# Patient Record
Sex: FEMALE | Race: WHITE | Employment: OTHER | URBAN - METROPOLITAN AREA
[De-identification: names, ages, dates, MRNs, and addresses within clinical notes are randomized per-mention and may not be internally consistent; named-entity substitution may affect disease eponyms.]

---

## 2019-09-20 ENCOUNTER — HOSPITAL ENCOUNTER (OUTPATIENT)
Age: 74
Setting detail: SPECIMEN
Discharge: HOME | End: 2019-09-20
Payer: MEDICARE

## 2019-09-20 ENCOUNTER — OFFICE VISIT (OUTPATIENT)
Dept: FAMILY MEDICINE CLINIC | Age: 74
End: 2019-09-20
Payer: COMMERCIAL

## 2019-09-20 VITALS
DIASTOLIC BLOOD PRESSURE: 84 MMHG | HEART RATE: 77 BPM | OXYGEN SATURATION: 88 % | RESPIRATION RATE: 16 BRPM | WEIGHT: 255 LBS | HEIGHT: 56 IN | BODY MASS INDEX: 57.36 KG/M2 | SYSTOLIC BLOOD PRESSURE: 128 MMHG | TEMPERATURE: 97.1 F

## 2019-09-20 DIAGNOSIS — R35.0 FREQUENT URINATION: ICD-10-CM

## 2019-09-20 DIAGNOSIS — E66.01 MORBID OBESITY WITH BMI OF 50.0-59.9, ADULT (HCC): ICD-10-CM

## 2019-09-20 DIAGNOSIS — I10 ESSENTIAL HYPERTENSION: ICD-10-CM

## 2019-09-20 DIAGNOSIS — Z86.73 HISTORY OF CVA (CEREBROVASCULAR ACCIDENT): ICD-10-CM

## 2019-09-20 DIAGNOSIS — N30.90 CYSTITIS: ICD-10-CM

## 2019-09-20 DIAGNOSIS — I48.21 PERMANENT ATRIAL FIBRILLATION (HCC): ICD-10-CM

## 2019-09-20 DIAGNOSIS — Z91.81 HISTORY OF FALL: ICD-10-CM

## 2019-09-20 DIAGNOSIS — F03.90 DEMENTIA WITHOUT BEHAVIORAL DISTURBANCE, UNSPECIFIED DEMENTIA TYPE: ICD-10-CM

## 2019-09-20 DIAGNOSIS — N30.90 CYSTITIS: Primary | ICD-10-CM

## 2019-09-20 LAB
BILIRUBIN, POC: NEGATIVE
BLOOD URINE, POC: ABNORMAL
CLARITY, POC: ABNORMAL
COLOR, POC: ABNORMAL
GLUCOSE URINE, POC: NEGATIVE
KETONES, POC: NEGATIVE
LEUKOCYTE EST, POC: POSITIVE
NITRITE, POC: POSITIVE
PH, POC: 6
PROTEIN, POC: 30
SPECIFIC GRAVITY, POC: 1.02
UROBILINOGEN, POC: 2

## 2019-09-20 PROCEDURE — 3017F COLORECTAL CA SCREEN DOC REV: CPT | Performed by: INTERNAL MEDICINE

## 2019-09-20 PROCEDURE — 1090F PRES/ABSN URINE INCON ASSESS: CPT | Performed by: INTERNAL MEDICINE

## 2019-09-20 PROCEDURE — G8417 CALC BMI ABV UP PARAM F/U: HCPCS | Performed by: INTERNAL MEDICINE

## 2019-09-20 PROCEDURE — 1123F ACP DISCUSS/DSCN MKR DOCD: CPT | Performed by: INTERNAL MEDICINE

## 2019-09-20 PROCEDURE — 81002 URINALYSIS NONAUTO W/O SCOPE: CPT | Performed by: INTERNAL MEDICINE

## 2019-09-20 PROCEDURE — 99203 OFFICE O/P NEW LOW 30 MIN: CPT | Performed by: INTERNAL MEDICINE

## 2019-09-20 PROCEDURE — G8400 PT W/DXA NO RESULTS DOC: HCPCS | Performed by: INTERNAL MEDICINE

## 2019-09-20 PROCEDURE — 4040F PNEUMOC VAC/ADMIN/RCVD: CPT | Performed by: INTERNAL MEDICINE

## 2019-09-20 PROCEDURE — 1036F TOBACCO NON-USER: CPT | Performed by: INTERNAL MEDICINE

## 2019-09-20 PROCEDURE — G8427 DOCREV CUR MEDS BY ELIG CLIN: HCPCS | Performed by: INTERNAL MEDICINE

## 2019-09-20 RX ORDER — CLOPIDOGREL BISULFATE 75 MG/1
75 TABLET ORAL DAILY
COMMUNITY
End: 2019-10-09 | Stop reason: ALTCHOICE

## 2019-09-20 RX ORDER — ATORVASTATIN CALCIUM 40 MG/1
40 TABLET, FILM COATED ORAL DAILY
COMMUNITY
End: 2019-11-01 | Stop reason: SDUPTHER

## 2019-09-20 RX ORDER — CITALOPRAM 20 MG/1
20 TABLET ORAL DAILY
COMMUNITY
End: 2019-09-20 | Stop reason: SDUPTHER

## 2019-09-20 RX ORDER — CITALOPRAM 20 MG/1
20 TABLET ORAL DAILY
Qty: 90 TABLET | Refills: 5 | Status: SHIPPED | OUTPATIENT
Start: 2019-09-20 | End: 2020-04-02 | Stop reason: SDUPTHER

## 2019-09-20 RX ORDER — PANTOPRAZOLE SODIUM 40 MG/1
40 TABLET, DELAYED RELEASE ORAL DAILY
COMMUNITY
End: 2019-11-01 | Stop reason: SDUPTHER

## 2019-09-20 RX ORDER — HYDRALAZINE HYDROCHLORIDE 10 MG/1
10 TABLET, FILM COATED ORAL 3 TIMES DAILY
COMMUNITY
End: 2019-11-01 | Stop reason: SDUPTHER

## 2019-09-20 RX ORDER — NITROFURANTOIN 25; 75 MG/1; MG/1
100 CAPSULE ORAL 2 TIMES DAILY
Qty: 14 CAPSULE | Refills: 0 | Status: SHIPPED | OUTPATIENT
Start: 2019-09-20 | End: 2019-10-08 | Stop reason: SDUPTHER

## 2019-09-20 RX ORDER — DONEPEZIL HYDROCHLORIDE 10 MG/1
10 TABLET, FILM COATED ORAL NIGHTLY
COMMUNITY
End: 2019-11-01 | Stop reason: SDUPTHER

## 2019-09-20 RX ORDER — FUROSEMIDE 40 MG/1
40 TABLET ORAL 2 TIMES DAILY
COMMUNITY
End: 2019-09-20 | Stop reason: SDUPTHER

## 2019-09-20 RX ORDER — AMLODIPINE BESYLATE 2.5 MG/1
2.5 TABLET ORAL DAILY
COMMUNITY
End: 2019-10-08 | Stop reason: ALTCHOICE

## 2019-09-20 RX ORDER — FUROSEMIDE 40 MG/1
40 TABLET ORAL 2 TIMES DAILY
Qty: 180 TABLET | Refills: 5 | Status: SHIPPED | OUTPATIENT
Start: 2019-09-20

## 2019-09-20 RX ORDER — WARFARIN SODIUM 5 MG/1
TABLET ORAL
Qty: 90 TABLET | Refills: 5 | Status: SHIPPED | OUTPATIENT
Start: 2019-09-20 | End: 2019-12-19 | Stop reason: SDUPTHER

## 2019-09-20 SDOH — HEALTH STABILITY: MENTAL HEALTH: HOW OFTEN DO YOU HAVE A DRINK CONTAINING ALCOHOL?: NEVER

## 2019-09-21 LAB
CULTURE: ABNORMAL
Lab: ABNORMAL
SPECIMEN DESCRIPTION: ABNORMAL

## 2019-10-01 ENCOUNTER — APPOINTMENT (OUTPATIENT)
Dept: GENERAL RADIOLOGY | Age: 74
End: 2019-10-01
Payer: MEDICARE

## 2019-10-01 ENCOUNTER — APPOINTMENT (OUTPATIENT)
Dept: CT IMAGING | Age: 74
End: 2019-10-01
Payer: MEDICARE

## 2019-10-01 ENCOUNTER — TELEPHONE (OUTPATIENT)
Dept: FAMILY MEDICINE CLINIC | Age: 74
End: 2019-10-01

## 2019-10-01 ENCOUNTER — HOSPITAL ENCOUNTER (EMERGENCY)
Age: 74
Discharge: HOME | End: 2019-10-01
Attending: EMERGENCY MEDICINE
Payer: MEDICARE

## 2019-10-01 VITALS
HEART RATE: 66 BPM | OXYGEN SATURATION: 94 % | BODY MASS INDEX: 55.02 KG/M2 | TEMPERATURE: 98.2 F | DIASTOLIC BLOOD PRESSURE: 53 MMHG | WEIGHT: 255 LBS | SYSTOLIC BLOOD PRESSURE: 122 MMHG | RESPIRATION RATE: 20 BRPM | HEIGHT: 57 IN

## 2019-10-01 DIAGNOSIS — R53.1 GENERAL WEAKNESS: Primary | ICD-10-CM

## 2019-10-01 DIAGNOSIS — E87.6 HYPOKALEMIA: ICD-10-CM

## 2019-10-01 LAB
-: ABNORMAL
ABSOLUTE EOS #: 0.16 K/UL (ref 0–0.44)
ABSOLUTE IMMATURE GRANULOCYTE: 0.01 K/UL (ref 0–0.3)
ABSOLUTE LYMPH #: 0.78 K/UL (ref 1.1–3.7)
ABSOLUTE MONO #: 0.37 K/UL (ref 0.1–1.2)
AMMONIA: 30 UMOL/L (ref 11–51)
AMORPHOUS: ABNORMAL
ANION GAP SERPL CALCULATED.3IONS-SCNC: 7 MMOL/L (ref 9–17)
BACTERIA: ABNORMAL
BASOPHILS # BLD: 1 % (ref 0–2)
BASOPHILS ABSOLUTE: 0.05 K/UL (ref 0–0.2)
BILIRUBIN URINE: NEGATIVE
BNP INTERPRETATION: ABNORMAL
BUN BLDV-MCNC: 13 MG/DL (ref 8–23)
BUN/CREAT BLD: 22 (ref 9–20)
CALCIUM SERPL-MCNC: 8.7 MG/DL (ref 8.6–10.4)
CASTS UA: ABNORMAL /LPF
CHLORIDE BLD-SCNC: 102 MMOL/L (ref 98–107)
CHP ED QC CHECK: NORMAL
CO2: 36 MMOL/L (ref 20–31)
COLOR: YELLOW
COMMENT UA: ABNORMAL
CREAT SERPL-MCNC: 0.6 MG/DL (ref 0.5–0.9)
CRYSTALS, UA: ABNORMAL /HPF
DIFFERENTIAL TYPE: ABNORMAL
EOSINOPHILS RELATIVE PERCENT: 3 % (ref 1–4)
EPITHELIAL CELLS UA: ABNORMAL /HPF (ref 0–5)
GFR AFRICAN AMERICAN: >60 ML/MIN
GFR NON-AFRICAN AMERICAN: >60 ML/MIN
GFR SERPL CREATININE-BSD FRML MDRD: ABNORMAL ML/MIN/{1.73_M2}
GFR SERPL CREATININE-BSD FRML MDRD: ABNORMAL ML/MIN/{1.73_M2}
GLUCOSE BLD-MCNC: 105 MG/DL
GLUCOSE BLD-MCNC: 105 MG/DL (ref 65–105)
GLUCOSE BLD-MCNC: 123 MG/DL (ref 70–99)
GLUCOSE URINE: NEGATIVE
HCT VFR BLD CALC: 43.7 % (ref 36.3–47.1)
HEMOGLOBIN: 13.1 G/DL (ref 11.9–15.1)
IMMATURE GRANULOCYTES: 0 %
INR BLD: 1.9
KETONES, URINE: NEGATIVE
LEUKOCYTE ESTERASE, URINE: NEGATIVE
LYMPHOCYTES # BLD: 13 % (ref 24–43)
MAGNESIUM: 1.8 MG/DL (ref 1.6–2.6)
MCH RBC QN AUTO: 30.9 PG (ref 25.2–33.5)
MCHC RBC AUTO-ENTMCNC: 30 G/DL (ref 28.4–34.8)
MCV RBC AUTO: 103.1 FL (ref 82.6–102.9)
MONOCYTES # BLD: 6 % (ref 3–12)
MUCUS: ABNORMAL
NITRITE, URINE: NEGATIVE
NRBC AUTOMATED: 0 PER 100 WBC
OTHER OBSERVATIONS UA: ABNORMAL
PDW BLD-RTO: 13.9 % (ref 11.8–14.4)
PH UA: 5.5 (ref 5–8)
PLATELET # BLD: 194 K/UL (ref 138–453)
PLATELET ESTIMATE: ABNORMAL
PMV BLD AUTO: 10.8 FL (ref 8.1–13.5)
POTASSIUM SERPL-SCNC: 3.2 MMOL/L (ref 3.7–5.3)
PRO-BNP: 605 PG/ML
PROTEIN UA: ABNORMAL
PROTHROMBIN TIME: 19.1 SEC (ref 9.7–11.6)
RBC # BLD: 4.24 M/UL (ref 3.95–5.11)
RBC # BLD: ABNORMAL 10*6/UL
RBC UA: ABNORMAL /HPF (ref 0–2)
RENAL EPITHELIAL, UA: ABNORMAL /HPF
SEG NEUTROPHILS: 77 % (ref 36–65)
SEGMENTED NEUTROPHILS ABSOLUTE COUNT: 4.48 K/UL (ref 1.5–8.1)
SODIUM BLD-SCNC: 145 MMOL/L (ref 135–144)
SPECIFIC GRAVITY UA: 1.02 (ref 1–1.03)
THYROXINE, FREE: 1.28 NG/DL (ref 0.93–1.7)
TRICHOMONAS: ABNORMAL
TROPONIN INTERP: NORMAL
TROPONIN T: NORMAL NG/ML
TROPONIN, HIGH SENSITIVITY: 7 NG/L (ref 0–14)
TSH SERPL DL<=0.05 MIU/L-ACNC: 2.01 MIU/L (ref 0.3–5)
TURBIDITY: CLEAR
URINE HGB: ABNORMAL
UROBILINOGEN, URINE: ABNORMAL
WBC # BLD: 5.9 K/UL (ref 3.5–11.3)
WBC # BLD: ABNORMAL 10*3/UL
WBC UA: ABNORMAL /HPF (ref 0–5)
YEAST: ABNORMAL

## 2019-10-01 PROCEDURE — 6370000000 HC RX 637 (ALT 250 FOR IP): Performed by: EMERGENCY MEDICINE

## 2019-10-01 PROCEDURE — 84439 ASSAY OF FREE THYROXINE: CPT

## 2019-10-01 PROCEDURE — 70498 CT ANGIOGRAPHY NECK: CPT

## 2019-10-01 PROCEDURE — 85610 PROTHROMBIN TIME: CPT

## 2019-10-01 PROCEDURE — 82140 ASSAY OF AMMONIA: CPT

## 2019-10-01 PROCEDURE — 84484 ASSAY OF TROPONIN QUANT: CPT

## 2019-10-01 PROCEDURE — 70450 CT HEAD/BRAIN W/O DYE: CPT

## 2019-10-01 PROCEDURE — 71045 X-RAY EXAM CHEST 1 VIEW: CPT

## 2019-10-01 PROCEDURE — 85025 COMPLETE CBC W/AUTO DIFF WBC: CPT

## 2019-10-01 PROCEDURE — 83880 ASSAY OF NATRIURETIC PEPTIDE: CPT

## 2019-10-01 PROCEDURE — 2580000003 HC RX 258: Performed by: EMERGENCY MEDICINE

## 2019-10-01 PROCEDURE — 99284 EMERGENCY DEPT VISIT MOD MDM: CPT

## 2019-10-01 PROCEDURE — 84443 ASSAY THYROID STIM HORMONE: CPT

## 2019-10-01 PROCEDURE — 83735 ASSAY OF MAGNESIUM: CPT

## 2019-10-01 PROCEDURE — 80048 BASIC METABOLIC PNL TOTAL CA: CPT

## 2019-10-01 PROCEDURE — 81001 URINALYSIS AUTO W/SCOPE: CPT

## 2019-10-01 PROCEDURE — 6360000004 HC RX CONTRAST MEDICATION: Performed by: EMERGENCY MEDICINE

## 2019-10-01 PROCEDURE — 82947 ASSAY GLUCOSE BLOOD QUANT: CPT

## 2019-10-01 RX ORDER — SODIUM CHLORIDE 0.9 % (FLUSH) 0.9 %
10 SYRINGE (ML) INJECTION
Status: COMPLETED | OUTPATIENT
Start: 2019-10-01 | End: 2019-10-01

## 2019-10-01 RX ORDER — POTASSIUM CHLORIDE 20 MEQ/1
20 TABLET, EXTENDED RELEASE ORAL DAILY
Qty: 20 TABLET | Refills: 0 | Status: ON HOLD | OUTPATIENT
Start: 2019-10-01 | End: 2019-11-22 | Stop reason: SDDI

## 2019-10-01 RX ORDER — 0.9 % SODIUM CHLORIDE 0.9 %
80 INTRAVENOUS SOLUTION INTRAVENOUS ONCE
Status: COMPLETED | OUTPATIENT
Start: 2019-10-01 | End: 2019-10-01

## 2019-10-01 RX ORDER — POTASSIUM CHLORIDE 20 MEQ/1
40 TABLET, EXTENDED RELEASE ORAL ONCE
Status: COMPLETED | OUTPATIENT
Start: 2019-10-01 | End: 2019-10-01

## 2019-10-01 RX ADMIN — POTASSIUM CHLORIDE 40 MEQ: 20 TABLET, EXTENDED RELEASE ORAL at 15:27

## 2019-10-01 RX ADMIN — SODIUM CHLORIDE 80 ML: 0.9 INJECTION, SOLUTION INTRAVENOUS at 15:58

## 2019-10-01 RX ADMIN — IOPAMIDOL 75 ML: 755 INJECTION, SOLUTION INTRAVENOUS at 14:03

## 2019-10-01 RX ADMIN — Medication 10 ML: at 14:18

## 2019-10-01 RX ADMIN — SODIUM CHLORIDE 80 ML: 0.9 INJECTION, SOLUTION INTRAVENOUS at 14:10

## 2019-10-01 ASSESSMENT — ENCOUNTER SYMPTOMS
ABDOMINAL PAIN: 0
EYE DISCHARGE: 0
FACIAL SWELLING: 0
EYE PAIN: 0
CHEST TIGHTNESS: 0
SHORTNESS OF BREATH: 0
ABDOMINAL DISTENTION: 0
BACK PAIN: 0

## 2019-10-02 ENCOUNTER — TELEPHONE (OUTPATIENT)
Dept: FAMILY MEDICINE CLINIC | Age: 74
End: 2019-10-02

## 2019-10-02 RX ORDER — NYSTATIN 100000 [USP'U]/G
POWDER TOPICAL
Qty: 60 G | Refills: 5 | Status: ON HOLD | OUTPATIENT
Start: 2019-10-02 | End: 2019-11-22

## 2019-10-08 ENCOUNTER — OFFICE VISIT (OUTPATIENT)
Dept: FAMILY MEDICINE CLINIC | Age: 74
End: 2019-10-08
Payer: MEDICARE

## 2019-10-08 ENCOUNTER — HOSPITAL ENCOUNTER (OUTPATIENT)
Age: 74
Setting detail: SPECIMEN
Discharge: HOME | End: 2019-10-08
Payer: MEDICARE

## 2019-10-08 VITALS
HEIGHT: 57 IN | WEIGHT: 253 LBS | OXYGEN SATURATION: 91 % | HEART RATE: 76 BPM | RESPIRATION RATE: 18 BRPM | BODY MASS INDEX: 54.58 KG/M2 | SYSTOLIC BLOOD PRESSURE: 110 MMHG | DIASTOLIC BLOOD PRESSURE: 58 MMHG

## 2019-10-08 DIAGNOSIS — I10 ESSENTIAL HYPERTENSION: ICD-10-CM

## 2019-10-08 DIAGNOSIS — R35.0 FREQUENT URINATION: Primary | ICD-10-CM

## 2019-10-08 DIAGNOSIS — R35.0 FREQUENT URINATION: ICD-10-CM

## 2019-10-08 DIAGNOSIS — S81.009S: ICD-10-CM

## 2019-10-08 DIAGNOSIS — L03.90 WOUND CELLULITIS: ICD-10-CM

## 2019-10-08 DIAGNOSIS — R41.0 CONFUSION: ICD-10-CM

## 2019-10-08 DIAGNOSIS — F01.50 VASCULAR DEMENTIA WITHOUT BEHAVIORAL DISTURBANCE (HCC): ICD-10-CM

## 2019-10-08 DIAGNOSIS — N39.46 MIXED STRESS AND URGE URINARY INCONTINENCE: ICD-10-CM

## 2019-10-08 DIAGNOSIS — R41.82 ALTERED MENTAL STATUS, UNSPECIFIED ALTERED MENTAL STATUS TYPE: ICD-10-CM

## 2019-10-08 LAB
BILIRUBIN, POC: NEGATIVE
BLOOD URINE, POC: NORMAL
CLARITY, POC: NORMAL
COLOR, POC: NORMAL
GLUCOSE URINE, POC: NORMAL
KETONES, POC: NORMAL
LEUKOCYTE EST, POC: NEGATIVE
NITRITE, POC: NEGATIVE
PH, POC: 7
PROTEIN, POC: NEGATIVE
SPECIFIC GRAVITY, POC: 1.02
UROBILINOGEN, POC: 1

## 2019-10-08 PROCEDURE — 4040F PNEUMOC VAC/ADMIN/RCVD: CPT | Performed by: INTERNAL MEDICINE

## 2019-10-08 PROCEDURE — 99214 OFFICE O/P EST MOD 30 MIN: CPT | Performed by: INTERNAL MEDICINE

## 2019-10-08 PROCEDURE — 81002 URINALYSIS NONAUTO W/O SCOPE: CPT | Performed by: INTERNAL MEDICINE

## 2019-10-08 PROCEDURE — G8427 DOCREV CUR MEDS BY ELIG CLIN: HCPCS | Performed by: INTERNAL MEDICINE

## 2019-10-08 PROCEDURE — 3017F COLORECTAL CA SCREEN DOC REV: CPT | Performed by: INTERNAL MEDICINE

## 2019-10-08 PROCEDURE — 1123F ACP DISCUSS/DSCN MKR DOCD: CPT | Performed by: INTERNAL MEDICINE

## 2019-10-08 PROCEDURE — G8484 FLU IMMUNIZE NO ADMIN: HCPCS | Performed by: INTERNAL MEDICINE

## 2019-10-08 PROCEDURE — G8417 CALC BMI ABV UP PARAM F/U: HCPCS | Performed by: INTERNAL MEDICINE

## 2019-10-08 PROCEDURE — 1036F TOBACCO NON-USER: CPT | Performed by: INTERNAL MEDICINE

## 2019-10-08 PROCEDURE — G8400 PT W/DXA NO RESULTS DOC: HCPCS | Performed by: INTERNAL MEDICINE

## 2019-10-08 PROCEDURE — 1090F PRES/ABSN URINE INCON ASSESS: CPT | Performed by: INTERNAL MEDICINE

## 2019-10-08 PROCEDURE — 0509F URINE INCON PLAN DOCD: CPT | Performed by: INTERNAL MEDICINE

## 2019-10-08 RX ORDER — FLUCONAZOLE 150 MG/1
150 TABLET ORAL EVERY OTHER DAY
Qty: 7 TABLET | Refills: 1 | Status: SHIPPED | OUTPATIENT
Start: 2019-10-08 | End: 2019-10-09 | Stop reason: ALTCHOICE

## 2019-10-08 RX ORDER — TERBINAFINE HYDROCHLORIDE 250 MG/1
250 TABLET ORAL DAILY
Qty: 14 TABLET | Refills: 0 | Status: SHIPPED | OUTPATIENT
Start: 2019-10-08 | End: 2019-10-08 | Stop reason: SDUPTHER

## 2019-10-08 RX ORDER — NITROFURANTOIN 25; 75 MG/1; MG/1
100 CAPSULE ORAL 2 TIMES DAILY
Qty: 10 CAPSULE | Refills: 0 | Status: SHIPPED | OUTPATIENT
Start: 2019-10-08 | End: 2019-10-13

## 2019-10-08 RX ORDER — TERBINAFINE HYDROCHLORIDE 250 MG/1
250 TABLET ORAL DAILY
Qty: 14 TABLET | Refills: 0 | Status: SHIPPED | OUTPATIENT
Start: 2019-10-08 | End: 2019-10-22

## 2019-10-08 ASSESSMENT — ENCOUNTER SYMPTOMS
SHORTNESS OF BREATH: 0
BACK PAIN: 0
ABDOMINAL PAIN: 0

## 2019-10-09 ASSESSMENT — ENCOUNTER SYMPTOMS
DIARRHEA: 0
COUGH: 0
BLOOD IN STOOL: 0
CONSTIPATION: 0
NAUSEA: 0
CHEST TIGHTNESS: 0
ANAL BLEEDING: 0

## 2019-10-10 LAB
CULTURE: ABNORMAL
Lab: ABNORMAL
SPECIMEN DESCRIPTION: ABNORMAL

## 2019-10-18 ENCOUNTER — TELEPHONE (OUTPATIENT)
Dept: FAMILY MEDICINE CLINIC | Age: 74
End: 2019-10-18

## 2019-10-18 RX ORDER — CEPHALEXIN 500 MG/1
500 CAPSULE ORAL 3 TIMES DAILY
Qty: 21 CAPSULE | Refills: 0 | Status: SHIPPED | OUTPATIENT
Start: 2019-10-18 | End: 2019-11-13

## 2019-10-22 ENCOUNTER — TELEPHONE (OUTPATIENT)
Dept: FAMILY MEDICINE CLINIC | Age: 74
End: 2019-10-22

## 2019-10-29 ENCOUNTER — TELEPHONE (OUTPATIENT)
Dept: FAMILY MEDICINE CLINIC | Age: 74
End: 2019-10-29

## 2019-10-29 DIAGNOSIS — I48.21 PERMANENT ATRIAL FIBRILLATION (HCC): Primary | ICD-10-CM

## 2019-10-31 LAB
INR BLD: 2.3
PROTIME: 26.1 SECONDS

## 2019-11-01 DIAGNOSIS — I48.21 PERMANENT ATRIAL FIBRILLATION (HCC): ICD-10-CM

## 2019-11-01 RX ORDER — ATORVASTATIN CALCIUM 40 MG/1
40 TABLET, FILM COATED ORAL DAILY
Qty: 30 TABLET | Refills: 5 | Status: SHIPPED | OUTPATIENT
Start: 2019-11-01

## 2019-11-01 RX ORDER — HYDRALAZINE HYDROCHLORIDE 10 MG/1
10 TABLET, FILM COATED ORAL 3 TIMES DAILY
Qty: 90 TABLET | Refills: 5 | Status: SHIPPED | OUTPATIENT
Start: 2019-11-01

## 2019-11-01 RX ORDER — DONEPEZIL HYDROCHLORIDE 10 MG/1
10 TABLET, FILM COATED ORAL NIGHTLY
Qty: 30 TABLET | Refills: 5 | Status: SHIPPED | OUTPATIENT
Start: 2019-11-01

## 2019-11-01 RX ORDER — PANTOPRAZOLE SODIUM 40 MG/1
40 TABLET, DELAYED RELEASE ORAL DAILY
Qty: 30 TABLET | Refills: 5 | Status: SHIPPED | OUTPATIENT
Start: 2019-11-01

## 2019-11-01 RX ORDER — CLOPIDOGREL BISULFATE 75 MG/1
75 TABLET ORAL DAILY
Qty: 30 TABLET | Refills: 5 | OUTPATIENT
Start: 2019-11-01

## 2019-11-06 ENCOUNTER — TELEPHONE (OUTPATIENT)
Dept: WOUND CARE | Age: 74
End: 2019-11-06

## 2019-11-06 ENCOUNTER — HOSPITAL ENCOUNTER (OUTPATIENT)
Dept: WOUND CARE | Age: 74
Discharge: HOME | End: 2019-11-06

## 2019-11-12 ENCOUNTER — TELEPHONE (OUTPATIENT)
Dept: PHARMACY | Age: 74
End: 2019-11-12

## 2019-11-13 ENCOUNTER — TELEPHONE (OUTPATIENT)
Dept: PHARMACY | Age: 74
End: 2019-11-13

## 2019-11-13 ENCOUNTER — HOSPITAL ENCOUNTER (OUTPATIENT)
Dept: WOUND CARE | Age: 74
Discharge: HOME | End: 2019-11-13
Payer: MEDICARE

## 2019-11-13 VITALS
WEIGHT: 255 LBS | HEIGHT: 56 IN | DIASTOLIC BLOOD PRESSURE: 87 MMHG | SYSTOLIC BLOOD PRESSURE: 169 MMHG | BODY MASS INDEX: 57.36 KG/M2 | RESPIRATION RATE: 16 BRPM | HEART RATE: 75 BPM | TEMPERATURE: 97.5 F

## 2019-11-13 DIAGNOSIS — F42.4 EXCORIATION (SKIN-PICKING) DISORDER: ICD-10-CM

## 2019-11-13 DIAGNOSIS — F01.50 VASCULAR DEMENTIA WITHOUT BEHAVIORAL DISTURBANCE (HCC): Primary | ICD-10-CM

## 2019-11-13 DIAGNOSIS — T14.8XXA MULTIPLE WOUNDS OF SKIN: ICD-10-CM

## 2019-11-13 DIAGNOSIS — I48.0 PAROXYSMAL ATRIAL FIBRILLATION (HCC): ICD-10-CM

## 2019-11-13 PROCEDURE — 6370000000 HC RX 637 (ALT 250 FOR IP): Performed by: SURGERY

## 2019-11-13 PROCEDURE — 99214 OFFICE O/P EST MOD 30 MIN: CPT

## 2019-11-13 PROCEDURE — 97597 DBRDMT OPN WND 1ST 20 CM/<: CPT

## 2019-11-13 RX ORDER — LIDOCAINE HYDROCHLORIDE 40 MG/ML
SOLUTION TOPICAL ONCE
Status: COMPLETED | OUTPATIENT
Start: 2019-11-13 | End: 2019-11-13

## 2019-11-13 RX ADMIN — LIDOCAINE HYDROCHLORIDE: 40 SOLUTION TOPICAL at 15:24

## 2019-11-13 SDOH — HEALTH STABILITY: PHYSICAL HEALTH: ON AVERAGE, HOW MANY DAYS PER WEEK DO YOU ENGAGE IN MODERATE TO STRENUOUS EXERCISE (LIKE A BRISK WALK)?: 0 DAYS

## 2019-11-13 ASSESSMENT — PAIN SCALES - GENERAL: PAINLEVEL_OUTOF10: 0

## 2019-11-13 ASSESSMENT — PAIN DESCRIPTION - PAIN TYPE: TYPE: CHRONIC PAIN

## 2019-11-17 LAB — INR BLD: 3.4

## 2019-11-18 ENCOUNTER — HOSPITAL ENCOUNTER (OUTPATIENT)
Dept: PHARMACY | Age: 74
Discharge: HOME | End: 2019-11-18

## 2019-11-18 DIAGNOSIS — I48.91 ATRIAL FIBRILLATION, UNSPECIFIED TYPE (HCC): ICD-10-CM

## 2019-11-21 ENCOUNTER — HOSPITAL ENCOUNTER (INPATIENT)
Age: 74
LOS: 4 days | Discharge: NURSING, REHAB OR LONG-TERM CARE | DRG: 291 | End: 2019-11-25
Attending: EMERGENCY MEDICINE | Admitting: INTERNAL MEDICINE
Payer: MEDICARE

## 2019-11-21 ENCOUNTER — APPOINTMENT (OUTPATIENT)
Dept: GENERAL RADIOLOGY | Age: 74
DRG: 291 | End: 2019-11-21
Payer: MEDICARE

## 2019-11-21 ENCOUNTER — TELEPHONE (OUTPATIENT)
Dept: FAMILY MEDICINE CLINIC | Age: 74
End: 2019-11-21

## 2019-11-21 DIAGNOSIS — I50.23 ACUTE ON CHRONIC SYSTOLIC CONGESTIVE HEART FAILURE (HCC): Primary | ICD-10-CM

## 2019-11-21 DIAGNOSIS — R09.02 HYPOXIA: ICD-10-CM

## 2019-11-21 PROBLEM — I50.9 HEART FAILURE (HCC): Status: ACTIVE | Noted: 2019-11-21

## 2019-11-21 LAB
ABSOLUTE EOS #: 0 K/UL (ref 0–0.4)
ABSOLUTE IMMATURE GRANULOCYTE: 0 K/UL (ref 0–0.3)
ABSOLUTE LYMPH #: 0.31 K/UL (ref 1–4.8)
ABSOLUTE MONO #: 0.82 K/UL (ref 0.2–0.8)
ALBUMIN SERPL-MCNC: 3.8 G/DL (ref 3.5–5.2)
ALBUMIN/GLOBULIN RATIO: ABNORMAL (ref 1–2.5)
ALP BLD-CCNC: 158 U/L (ref 35–104)
ALT SERPL-CCNC: 7 U/L (ref 5–33)
ANION GAP SERPL CALCULATED.3IONS-SCNC: 12 MMOL/L (ref 9–17)
AST SERPL-CCNC: 14 U/L
BASOPHILS # BLD: 0 %
BASOPHILS ABSOLUTE: 0 K/UL (ref 0–0.2)
BILIRUB SERPL-MCNC: 1.4 MG/DL (ref 0.3–1.2)
BNP INTERPRETATION: ABNORMAL
BUN BLDV-MCNC: 12 MG/DL (ref 8–23)
BUN/CREAT BLD: 20 (ref 9–20)
CALCIUM SERPL-MCNC: 9.1 MG/DL (ref 8.6–10.4)
CHLORIDE BLD-SCNC: 96 MMOL/L (ref 98–107)
CO2: 33 MMOL/L (ref 20–31)
CREAT SERPL-MCNC: 0.59 MG/DL (ref 0.5–0.9)
DIFFERENTIAL TYPE: ABNORMAL
DIRECT EXAM: NORMAL
EOSINOPHILS RELATIVE PERCENT: 0 % (ref 1–4)
GFR AFRICAN AMERICAN: >60 ML/MIN
GFR NON-AFRICAN AMERICAN: >60 ML/MIN
GFR SERPL CREATININE-BSD FRML MDRD: ABNORMAL ML/MIN/{1.73_M2}
GFR SERPL CREATININE-BSD FRML MDRD: ABNORMAL ML/MIN/{1.73_M2}
GLUCOSE BLD-MCNC: 122 MG/DL (ref 70–99)
HCT VFR BLD CALC: 44.7 % (ref 36.3–47.1)
HEMOGLOBIN: 13.8 G/DL (ref 11.9–15.1)
IMMATURE GRANULOCYTES: 0 %
INR BLD: 1.5
LYMPHOCYTES # BLD: 3 % (ref 24–44)
Lab: NORMAL
MAGNESIUM: 1.8 MG/DL (ref 1.6–2.6)
MCH RBC QN AUTO: 30.7 PG (ref 25.2–33.5)
MCHC RBC AUTO-ENTMCNC: 30.9 G/DL (ref 28.4–34.8)
MCV RBC AUTO: 99.3 FL (ref 82.6–102.9)
MONOCYTES # BLD: 8 % (ref 1–7)
NRBC AUTOMATED: 0 PER 100 WBC
PDW BLD-RTO: 14.3 % (ref 11.8–14.4)
PLATELET # BLD: 201 K/UL (ref 138–453)
PLATELET ESTIMATE: ABNORMAL
PMV BLD AUTO: 10.6 FL (ref 8.1–13.5)
POTASSIUM SERPL-SCNC: 3.3 MMOL/L (ref 3.7–5.3)
PRO-BNP: 1087 PG/ML
PROTHROMBIN TIME: 15.2 SEC (ref 9.7–11.6)
RBC # BLD: 4.5 M/UL (ref 3.95–5.11)
RBC # BLD: ABNORMAL 10*6/UL
SEG NEUTROPHILS: 89 % (ref 36–66)
SEGMENTED NEUTROPHILS ABSOLUTE COUNT: 9.17 K/UL (ref 1.8–7.7)
SODIUM BLD-SCNC: 141 MMOL/L (ref 135–144)
SPECIMEN DESCRIPTION: NORMAL
TOTAL PROTEIN: 6.9 G/DL (ref 6.4–8.3)
TROPONIN INTERP: NORMAL
TROPONIN INTERP: NORMAL
TROPONIN T: NORMAL NG/ML
TROPONIN T: NORMAL NG/ML
TROPONIN, HIGH SENSITIVITY: 8 NG/L (ref 0–14)
TROPONIN, HIGH SENSITIVITY: 9 NG/L (ref 0–14)
WBC # BLD: 10.3 K/UL (ref 3.5–11.3)
WBC # BLD: ABNORMAL 10*3/UL

## 2019-11-21 PROCEDURE — 80053 COMPREHEN METABOLIC PANEL: CPT

## 2019-11-21 PROCEDURE — 36415 COLL VENOUS BLD VENIPUNCTURE: CPT

## 2019-11-21 PROCEDURE — 87086 URINE CULTURE/COLONY COUNT: CPT

## 2019-11-21 PROCEDURE — 87329 GIARDIA AG IA: CPT

## 2019-11-21 PROCEDURE — 87804 INFLUENZA ASSAY W/OPTIC: CPT

## 2019-11-21 PROCEDURE — 93005 ELECTROCARDIOGRAM TRACING: CPT | Performed by: EMERGENCY MEDICINE

## 2019-11-21 PROCEDURE — 1200000000 HC SEMI PRIVATE

## 2019-11-21 PROCEDURE — 87328 CRYPTOSPORIDIUM AG IA: CPT

## 2019-11-21 PROCEDURE — 85025 COMPLETE CBC W/AUTO DIFF WBC: CPT

## 2019-11-21 PROCEDURE — 83880 ASSAY OF NATRIURETIC PEPTIDE: CPT

## 2019-11-21 PROCEDURE — 99285 EMERGENCY DEPT VISIT HI MDM: CPT

## 2019-11-21 PROCEDURE — 96374 THER/PROPH/DIAG INJ IV PUSH: CPT

## 2019-11-21 PROCEDURE — 83735 ASSAY OF MAGNESIUM: CPT

## 2019-11-21 PROCEDURE — 6370000000 HC RX 637 (ALT 250 FOR IP): Performed by: EMERGENCY MEDICINE

## 2019-11-21 PROCEDURE — 85610 PROTHROMBIN TIME: CPT

## 2019-11-21 PROCEDURE — 99223 1ST HOSP IP/OBS HIGH 75: CPT | Performed by: NURSE PRACTITIONER

## 2019-11-21 PROCEDURE — 87506 IADNA-DNA/RNA PROBE TQ 6-11: CPT

## 2019-11-21 PROCEDURE — 6360000002 HC RX W HCPCS: Performed by: EMERGENCY MEDICINE

## 2019-11-21 PROCEDURE — 87493 C DIFF AMPLIFIED PROBE: CPT

## 2019-11-21 PROCEDURE — 71046 X-RAY EXAM CHEST 2 VIEWS: CPT

## 2019-11-21 PROCEDURE — 84484 ASSAY OF TROPONIN QUANT: CPT

## 2019-11-21 RX ORDER — POTASSIUM CHLORIDE 20 MEQ/1
20 TABLET, EXTENDED RELEASE ORAL DAILY
Status: DISCONTINUED | OUTPATIENT
Start: 2019-11-22 | End: 2019-11-25 | Stop reason: HOSPADM

## 2019-11-21 RX ORDER — FUROSEMIDE 10 MG/ML
40 INJECTION INTRAMUSCULAR; INTRAVENOUS ONCE
Status: COMPLETED | OUTPATIENT
Start: 2019-11-21 | End: 2019-11-21

## 2019-11-21 RX ORDER — DONEPEZIL HYDROCHLORIDE 10 MG/1
10 TABLET, FILM COATED ORAL NIGHTLY
Status: DISCONTINUED | OUTPATIENT
Start: 2019-11-21 | End: 2019-11-25 | Stop reason: HOSPADM

## 2019-11-21 RX ORDER — ACETAMINOPHEN 325 MG/1
650 TABLET ORAL EVERY 4 HOURS PRN
Status: DISCONTINUED | OUTPATIENT
Start: 2019-11-21 | End: 2019-11-25 | Stop reason: HOSPADM

## 2019-11-21 RX ORDER — WARFARIN SODIUM 5 MG/1
5 TABLET ORAL DAILY
Status: DISCONTINUED | OUTPATIENT
Start: 2019-11-21 | End: 2019-11-25 | Stop reason: HOSPADM

## 2019-11-21 RX ORDER — POTASSIUM CHLORIDE 20 MEQ/1
40 TABLET, EXTENDED RELEASE ORAL ONCE
Status: COMPLETED | OUTPATIENT
Start: 2019-11-21 | End: 2019-11-21

## 2019-11-21 RX ORDER — ONDANSETRON 4 MG/1
4 TABLET, ORALLY DISINTEGRATING ORAL EVERY 6 HOURS PRN
Status: DISCONTINUED | OUTPATIENT
Start: 2019-11-21 | End: 2019-11-25 | Stop reason: HOSPADM

## 2019-11-21 RX ORDER — LISINOPRIL 5 MG/1
5 TABLET ORAL DAILY
Status: DISCONTINUED | OUTPATIENT
Start: 2019-11-22 | End: 2019-11-25 | Stop reason: HOSPADM

## 2019-11-21 RX ORDER — ONDANSETRON 2 MG/ML
4 INJECTION INTRAMUSCULAR; INTRAVENOUS EVERY 6 HOURS PRN
Status: DISCONTINUED | OUTPATIENT
Start: 2019-11-21 | End: 2019-11-21 | Stop reason: SDUPTHER

## 2019-11-21 RX ORDER — ATORVASTATIN CALCIUM 40 MG/1
40 TABLET, FILM COATED ORAL DAILY
Status: DISCONTINUED | OUTPATIENT
Start: 2019-11-22 | End: 2019-11-25 | Stop reason: HOSPADM

## 2019-11-21 RX ORDER — CARVEDILOL 3.12 MG/1
3.12 TABLET ORAL 2 TIMES DAILY WITH MEALS
Status: DISCONTINUED | OUTPATIENT
Start: 2019-11-22 | End: 2019-11-25 | Stop reason: HOSPADM

## 2019-11-21 RX ORDER — FUROSEMIDE 10 MG/ML
40 INJECTION INTRAMUSCULAR; INTRAVENOUS 2 TIMES DAILY
Status: DISCONTINUED | OUTPATIENT
Start: 2019-11-22 | End: 2019-11-22

## 2019-11-21 RX ORDER — CITALOPRAM 20 MG/1
20 TABLET ORAL DAILY
Status: DISCONTINUED | OUTPATIENT
Start: 2019-11-22 | End: 2019-11-25 | Stop reason: HOSPADM

## 2019-11-21 RX ORDER — PANTOPRAZOLE SODIUM 40 MG/1
40 TABLET, DELAYED RELEASE ORAL
Status: DISCONTINUED | OUTPATIENT
Start: 2019-11-22 | End: 2019-11-25 | Stop reason: HOSPADM

## 2019-11-21 RX ORDER — SODIUM CHLORIDE 0.9 % (FLUSH) 0.9 %
10 SYRINGE (ML) INJECTION PRN
Status: DISCONTINUED | OUTPATIENT
Start: 2019-11-21 | End: 2019-11-25 | Stop reason: HOSPADM

## 2019-11-21 RX ORDER — HYDRALAZINE HYDROCHLORIDE 10 MG/1
10 TABLET, FILM COATED ORAL 3 TIMES DAILY
Status: DISCONTINUED | OUTPATIENT
Start: 2019-11-21 | End: 2019-11-25 | Stop reason: HOSPADM

## 2019-11-21 RX ORDER — SODIUM CHLORIDE 0.9 % (FLUSH) 0.9 %
10 SYRINGE (ML) INJECTION EVERY 12 HOURS SCHEDULED
Status: DISCONTINUED | OUTPATIENT
Start: 2019-11-21 | End: 2019-11-25 | Stop reason: HOSPADM

## 2019-11-21 RX ORDER — NICOTINE 21 MG/24HR
1 PATCH, TRANSDERMAL 24 HOURS TRANSDERMAL DAILY PRN
Status: DISCONTINUED | OUTPATIENT
Start: 2019-11-21 | End: 2019-11-25 | Stop reason: HOSPADM

## 2019-11-21 RX ORDER — ONDANSETRON 2 MG/ML
4 INJECTION INTRAMUSCULAR; INTRAVENOUS EVERY 6 HOURS PRN
Status: DISCONTINUED | OUTPATIENT
Start: 2019-11-21 | End: 2019-11-25 | Stop reason: HOSPADM

## 2019-11-21 RX ADMIN — POTASSIUM CHLORIDE 40 MEQ: 20 TABLET, EXTENDED RELEASE ORAL at 21:23

## 2019-11-21 RX ADMIN — FUROSEMIDE 40 MG: 10 INJECTION, SOLUTION INTRAMUSCULAR; INTRAVENOUS at 21:23

## 2019-11-21 ASSESSMENT — ENCOUNTER SYMPTOMS
SHORTNESS OF BREATH: 1
DIARRHEA: 1
WHEEZING: 1
COUGH: 1
SPUTUM PRODUCTION: 1

## 2019-11-21 ASSESSMENT — PAIN SCALES - GENERAL: PAINLEVEL_OUTOF10: 2

## 2019-11-22 PROBLEM — I87.2 STASIS DERMATITIS OF BOTH LEGS: Status: ACTIVE | Noted: 2019-11-22

## 2019-11-22 PROBLEM — J20.8 ACUTE BRONCHITIS DUE TO OTHER SPECIFIED ORGANISMS: Status: ACTIVE | Noted: 2019-11-22

## 2019-11-22 PROBLEM — I89.0 LYMPHEDEMA OF BOTH LOWER EXTREMITIES: Status: ACTIVE | Noted: 2019-11-22

## 2019-11-22 LAB
-: ABNORMAL
AMORPHOUS: ABNORMAL
BACTERIA: ABNORMAL
BILIRUBIN URINE: NEGATIVE
BNP INTERPRETATION: ABNORMAL
CASTS UA: ABNORMAL /LPF
CHOLESTEROL/HDL RATIO: 2.7
CHOLESTEROL: 106 MG/DL
COLOR: YELLOW
COMMENT UA: ABNORMAL
CRYSTALS, UA: ABNORMAL /HPF
EKG ATRIAL RATE: 192 BPM
EKG Q-T INTERVAL: 430 MS
EKG QRS DURATION: 112 MS
EKG QTC CALCULATION (BAZETT): 517 MS
EKG R AXIS: -24 DEGREES
EKG T AXIS: 0 DEGREES
EKG VENTRICULAR RATE: 87 BPM
EPITHELIAL CELLS UA: ABNORMAL /HPF (ref 0–5)
GLUCOSE URINE: NEGATIVE
HCT VFR BLD CALC: 40 % (ref 36.3–47.1)
HDLC SERPL-MCNC: 39 MG/DL
HEMOGLOBIN: 12.3 G/DL (ref 11.9–15.1)
INR BLD: 1.4
KETONES, URINE: NEGATIVE
LDL CHOLESTEROL: 53 MG/DL (ref 0–130)
LEUKOCYTE ESTERASE, URINE: ABNORMAL
LV EF: 55 %
LVEF MODALITY: NORMAL
MAGNESIUM: 1.8 MG/DL (ref 1.6–2.6)
MCH RBC QN AUTO: 30.6 PG (ref 25.2–33.5)
MCHC RBC AUTO-ENTMCNC: 30.8 G/DL (ref 28.4–34.8)
MCV RBC AUTO: 99.5 FL (ref 82.6–102.9)
MUCUS: ABNORMAL
NITRITE, URINE: NEGATIVE
NRBC AUTOMATED: 0 PER 100 WBC
OTHER OBSERVATIONS UA: ABNORMAL
PDW BLD-RTO: 14.3 % (ref 11.8–14.4)
PH UA: 7 (ref 5–8)
PLATELET # BLD: 196 K/UL (ref 138–453)
PMV BLD AUTO: 10.9 FL (ref 8.1–13.5)
PRO-BNP: 1326 PG/ML
PROCALCITONIN: 0.09 NG/ML
PROTEIN UA: NEGATIVE
PROTHROMBIN TIME: 14.6 SEC (ref 9.7–11.6)
RBC # BLD: 4.02 M/UL (ref 3.95–5.11)
RBC UA: ABNORMAL /HPF (ref 0–2)
RENAL EPITHELIAL, UA: ABNORMAL /HPF
SPECIFIC GRAVITY UA: 1.01 (ref 1–1.03)
TRICHOMONAS: ABNORMAL
TRIGL SERPL-MCNC: 69 MG/DL
TSH SERPL DL<=0.05 MIU/L-ACNC: 0.82 MIU/L (ref 0.3–5)
TURBIDITY: CLEAR
URINE HGB: ABNORMAL
UROBILINOGEN, URINE: NORMAL
VLDLC SERPL CALC-MCNC: ABNORMAL MG/DL (ref 1–30)
WBC # BLD: 7.2 K/UL (ref 3.5–11.3)
WBC UA: ABNORMAL /HPF (ref 0–5)
YEAST: ABNORMAL

## 2019-11-22 PROCEDURE — 87186 SC STD MICRODIL/AGAR DIL: CPT

## 2019-11-22 PROCEDURE — 87088 URINE BACTERIA CULTURE: CPT

## 2019-11-22 PROCEDURE — 97161 PT EVAL LOW COMPLEX 20 MIN: CPT

## 2019-11-22 PROCEDURE — 93306 TTE W/DOPPLER COMPLETE: CPT

## 2019-11-22 PROCEDURE — 97116 GAIT TRAINING THERAPY: CPT

## 2019-11-22 PROCEDURE — 81001 URINALYSIS AUTO W/SCOPE: CPT

## 2019-11-22 PROCEDURE — 85610 PROTHROMBIN TIME: CPT

## 2019-11-22 PROCEDURE — 83735 ASSAY OF MAGNESIUM: CPT

## 2019-11-22 PROCEDURE — 36415 COLL VENOUS BLD VENIPUNCTURE: CPT

## 2019-11-22 PROCEDURE — 97530 THERAPEUTIC ACTIVITIES: CPT

## 2019-11-22 PROCEDURE — 99232 SBSQ HOSP IP/OBS MODERATE 35: CPT | Performed by: FAMILY MEDICINE

## 2019-11-22 PROCEDURE — 2580000003 HC RX 258: Performed by: NURSE PRACTITIONER

## 2019-11-22 PROCEDURE — 97167 OT EVAL HIGH COMPLEX 60 MIN: CPT

## 2019-11-22 PROCEDURE — 85027 COMPLETE CBC AUTOMATED: CPT

## 2019-11-22 PROCEDURE — 97535 SELF CARE MNGMENT TRAINING: CPT

## 2019-11-22 PROCEDURE — 84145 PROCALCITONIN (PCT): CPT

## 2019-11-22 PROCEDURE — 6370000000 HC RX 637 (ALT 250 FOR IP): Performed by: NURSE PRACTITIONER

## 2019-11-22 PROCEDURE — 6360000002 HC RX W HCPCS: Performed by: NURSE PRACTITIONER

## 2019-11-22 PROCEDURE — 83880 ASSAY OF NATRIURETIC PEPTIDE: CPT

## 2019-11-22 PROCEDURE — 93010 ELECTROCARDIOGRAM REPORT: CPT | Performed by: INTERNAL MEDICINE

## 2019-11-22 PROCEDURE — 1200000000 HC SEMI PRIVATE

## 2019-11-22 PROCEDURE — 84443 ASSAY THYROID STIM HORMONE: CPT

## 2019-11-22 PROCEDURE — 80061 LIPID PANEL: CPT

## 2019-11-22 PROCEDURE — 6370000000 HC RX 637 (ALT 250 FOR IP): Performed by: FAMILY MEDICINE

## 2019-11-22 RX ORDER — FUROSEMIDE 20 MG/1
20 TABLET ORAL DAILY
Status: DISCONTINUED | OUTPATIENT
Start: 2019-11-22 | End: 2019-11-22

## 2019-11-22 RX ORDER — NYSTATIN 100000 [USP'U]/G
POWDER TOPICAL 3 TIMES DAILY
COMMUNITY

## 2019-11-22 RX ORDER — FUROSEMIDE 40 MG/1
40 TABLET ORAL DAILY
Status: DISCONTINUED | OUTPATIENT
Start: 2019-11-22 | End: 2019-11-23

## 2019-11-22 RX ORDER — PREDNISONE 20 MG/1
40 TABLET ORAL DAILY
Status: DISCONTINUED | OUTPATIENT
Start: 2019-11-22 | End: 2019-11-25 | Stop reason: HOSPADM

## 2019-11-22 RX ORDER — ALBUTEROL SULFATE 2.5 MG/3ML
2.5 SOLUTION RESPIRATORY (INHALATION) EVERY 4 HOURS PRN
Status: DISCONTINUED | OUTPATIENT
Start: 2019-11-22 | End: 2019-11-25 | Stop reason: HOSPADM

## 2019-11-22 RX ADMIN — CARVEDILOL 3.12 MG: 3.12 TABLET, FILM COATED ORAL at 10:50

## 2019-11-22 RX ADMIN — ATORVASTATIN CALCIUM 40 MG: 40 TABLET, FILM COATED ORAL at 10:52

## 2019-11-22 RX ADMIN — LISINOPRIL 5 MG: 5 TABLET ORAL at 10:52

## 2019-11-22 RX ADMIN — DONEPEZIL HYDROCHLORIDE 10 MG: 10 TABLET, FILM COATED ORAL at 21:02

## 2019-11-22 RX ADMIN — FUROSEMIDE 40 MG: 10 INJECTION, SOLUTION INTRAMUSCULAR; INTRAVENOUS at 10:44

## 2019-11-22 RX ADMIN — CARVEDILOL 3.12 MG: 3.12 TABLET, FILM COATED ORAL at 17:18

## 2019-11-22 RX ADMIN — WARFARIN SODIUM 5 MG: 5 TABLET ORAL at 00:47

## 2019-11-22 RX ADMIN — Medication 10 ML: at 10:47

## 2019-11-22 RX ADMIN — POTASSIUM CHLORIDE 20 MEQ: 20 TABLET, EXTENDED RELEASE ORAL at 10:53

## 2019-11-22 RX ADMIN — HYDRALAZINE HYDROCHLORIDE 10 MG: 10 TABLET, FILM COATED ORAL at 14:42

## 2019-11-22 RX ADMIN — HYDRALAZINE HYDROCHLORIDE 10 MG: 10 TABLET, FILM COATED ORAL at 21:02

## 2019-11-22 RX ADMIN — Medication 10 ML: at 00:47

## 2019-11-22 RX ADMIN — PREDNISONE 40 MG: 20 TABLET ORAL at 10:55

## 2019-11-22 RX ADMIN — WARFARIN SODIUM 5 MG: 5 TABLET ORAL at 17:18

## 2019-11-22 RX ADMIN — CITALOPRAM HYDROBROMIDE 20 MG: 20 TABLET ORAL at 10:52

## 2019-11-22 RX ADMIN — Medication 10 ML: at 21:02

## 2019-11-22 RX ADMIN — FUROSEMIDE 40 MG: 40 TABLET ORAL at 15:59

## 2019-11-22 RX ADMIN — PANTOPRAZOLE SODIUM 40 MG: 40 TABLET, DELAYED RELEASE ORAL at 06:33

## 2019-11-22 RX ADMIN — HYDRALAZINE HYDROCHLORIDE 10 MG: 10 TABLET, FILM COATED ORAL at 00:47

## 2019-11-22 RX ADMIN — HYDRALAZINE HYDROCHLORIDE 10 MG: 10 TABLET, FILM COATED ORAL at 10:52

## 2019-11-22 RX ADMIN — DONEPEZIL HYDROCHLORIDE 10 MG: 10 TABLET, FILM COATED ORAL at 00:47

## 2019-11-22 ASSESSMENT — ENCOUNTER SYMPTOMS
WHEEZING: 1
DIARRHEA: 1
CHEST TIGHTNESS: 0
CONSTIPATION: 0
COUGH: 0
BLOOD IN STOOL: 0
ABDOMINAL PAIN: 0
CHEST TIGHTNESS: 1
SHORTNESS OF BREATH: 1
NAUSEA: 0
DIARRHEA: 0
RHINORRHEA: 0
VOMITING: 0

## 2019-11-23 PROBLEM — I27.20 PULMONARY HYPERTENSION, MODERATE TO SEVERE (HCC): Status: ACTIVE | Noted: 2019-11-23

## 2019-11-23 PROBLEM — N30.00 ACUTE CYSTITIS WITHOUT HEMATURIA: Status: ACTIVE | Noted: 2019-11-23

## 2019-11-23 LAB
ANION GAP SERPL CALCULATED.3IONS-SCNC: 7 MMOL/L (ref 9–17)
BUN BLDV-MCNC: 22 MG/DL (ref 8–23)
BUN/CREAT BLD: 30 (ref 9–20)
C DIFFICILE TOXINS, PCR: NORMAL
CALCIUM SERPL-MCNC: 9.3 MG/DL (ref 8.6–10.4)
CAMPYLOBACTER PCR: NORMAL
CHLORIDE BLD-SCNC: 97 MMOL/L (ref 98–107)
CO2: 38 MMOL/L (ref 20–31)
CREAT SERPL-MCNC: 0.73 MG/DL (ref 0.5–0.9)
E COLI ENTEROTOXIGENIC PCR: NORMAL
GFR AFRICAN AMERICAN: >60 ML/MIN
GFR NON-AFRICAN AMERICAN: >60 ML/MIN
GFR SERPL CREATININE-BSD FRML MDRD: ABNORMAL ML/MIN/{1.73_M2}
GFR SERPL CREATININE-BSD FRML MDRD: ABNORMAL ML/MIN/{1.73_M2}
GLUCOSE BLD-MCNC: 104 MG/DL (ref 70–99)
INR BLD: 1.6
PLESIOMONAS SHIGELLOIDES PCR: NORMAL
POTASSIUM SERPL-SCNC: 3.8 MMOL/L (ref 3.7–5.3)
PROTHROMBIN TIME: 16.6 SEC (ref 9.7–11.6)
SALMONELLA PCR: NORMAL
SHIGATOXIN GENE PCR: NORMAL
SHIGELLA SP PCR: NORMAL
SODIUM BLD-SCNC: 142 MMOL/L (ref 135–144)
SPECIMEN DESCRIPTION: NORMAL
SPECIMEN DESCRIPTION: NORMAL
VIBRIO PCR: NORMAL
YERSINIA ENTEROCOLITICA PCR: NORMAL

## 2019-11-23 PROCEDURE — 80048 BASIC METABOLIC PNL TOTAL CA: CPT

## 2019-11-23 PROCEDURE — 6360000002 HC RX W HCPCS: Performed by: FAMILY MEDICINE

## 2019-11-23 PROCEDURE — 36415 COLL VENOUS BLD VENIPUNCTURE: CPT

## 2019-11-23 PROCEDURE — 6370000000 HC RX 637 (ALT 250 FOR IP): Performed by: NURSE PRACTITIONER

## 2019-11-23 PROCEDURE — 85610 PROTHROMBIN TIME: CPT

## 2019-11-23 PROCEDURE — 2580000003 HC RX 258: Performed by: NURSE PRACTITIONER

## 2019-11-23 PROCEDURE — 1200000000 HC SEMI PRIVATE

## 2019-11-23 PROCEDURE — 99232 SBSQ HOSP IP/OBS MODERATE 35: CPT | Performed by: FAMILY MEDICINE

## 2019-11-23 PROCEDURE — 6370000000 HC RX 637 (ALT 250 FOR IP): Performed by: FAMILY MEDICINE

## 2019-11-23 PROCEDURE — 2580000003 HC RX 258: Performed by: FAMILY MEDICINE

## 2019-11-23 RX ORDER — FUROSEMIDE 40 MG/1
40 TABLET ORAL 2 TIMES DAILY
Status: DISCONTINUED | OUTPATIENT
Start: 2019-11-24 | End: 2019-11-25 | Stop reason: HOSPADM

## 2019-11-23 RX ADMIN — Medication 10 ML: at 20:09

## 2019-11-23 RX ADMIN — ATORVASTATIN CALCIUM 40 MG: 40 TABLET, FILM COATED ORAL at 10:07

## 2019-11-23 RX ADMIN — CEFTRIAXONE SODIUM 1 G: 1 INJECTION, POWDER, FOR SOLUTION INTRAMUSCULAR; INTRAVENOUS at 10:19

## 2019-11-23 RX ADMIN — CARVEDILOL 3.12 MG: 3.12 TABLET, FILM COATED ORAL at 17:44

## 2019-11-23 RX ADMIN — LISINOPRIL 5 MG: 5 TABLET ORAL at 10:06

## 2019-11-23 RX ADMIN — WARFARIN SODIUM 5 MG: 5 TABLET ORAL at 17:44

## 2019-11-23 RX ADMIN — PREDNISONE 40 MG: 20 TABLET ORAL at 10:06

## 2019-11-23 RX ADMIN — CARVEDILOL 3.12 MG: 3.12 TABLET, FILM COATED ORAL at 10:07

## 2019-11-23 RX ADMIN — FUROSEMIDE 40 MG: 40 TABLET ORAL at 10:07

## 2019-11-23 RX ADMIN — DONEPEZIL HYDROCHLORIDE 10 MG: 10 TABLET, FILM COATED ORAL at 20:09

## 2019-11-23 RX ADMIN — CITALOPRAM HYDROBROMIDE 20 MG: 20 TABLET ORAL at 10:07

## 2019-11-23 RX ADMIN — HYDRALAZINE HYDROCHLORIDE 10 MG: 10 TABLET, FILM COATED ORAL at 10:06

## 2019-11-23 RX ADMIN — HYDRALAZINE HYDROCHLORIDE 10 MG: 10 TABLET, FILM COATED ORAL at 20:09

## 2019-11-23 RX ADMIN — POTASSIUM CHLORIDE 20 MEQ: 20 TABLET, EXTENDED RELEASE ORAL at 10:06

## 2019-11-23 RX ADMIN — HYDRALAZINE HYDROCHLORIDE 10 MG: 10 TABLET, FILM COATED ORAL at 17:45

## 2019-11-23 RX ADMIN — PANTOPRAZOLE SODIUM 40 MG: 40 TABLET, DELAYED RELEASE ORAL at 05:42

## 2019-11-23 ASSESSMENT — ENCOUNTER SYMPTOMS
CONSTIPATION: 0
RHINORRHEA: 0
WHEEZING: 1
ABDOMINAL PAIN: 0
VOMITING: 0
DIARRHEA: 0
BLOOD IN STOOL: 0
CHEST TIGHTNESS: 0
SHORTNESS OF BREATH: 1
NAUSEA: 0
COUGH: 0

## 2019-11-23 ASSESSMENT — PAIN SCALES - GENERAL
PAINLEVEL_OUTOF10: 0

## 2019-11-24 LAB
ANION GAP SERPL CALCULATED.3IONS-SCNC: 5 MMOL/L (ref 9–17)
BNP INTERPRETATION: ABNORMAL
BUN BLDV-MCNC: 23 MG/DL (ref 8–23)
BUN/CREAT BLD: 34 (ref 9–20)
CALCIUM SERPL-MCNC: 9.1 MG/DL (ref 8.6–10.4)
CHLORIDE BLD-SCNC: 99 MMOL/L (ref 98–107)
CO2: 39 MMOL/L (ref 20–31)
CREAT SERPL-MCNC: 0.67 MG/DL (ref 0.5–0.9)
CULTURE: ABNORMAL
GFR AFRICAN AMERICAN: >60 ML/MIN
GFR NON-AFRICAN AMERICAN: >60 ML/MIN
GFR SERPL CREATININE-BSD FRML MDRD: ABNORMAL ML/MIN/{1.73_M2}
GFR SERPL CREATININE-BSD FRML MDRD: ABNORMAL ML/MIN/{1.73_M2}
GLUCOSE BLD-MCNC: 113 MG/DL (ref 70–99)
INR BLD: 1.9
Lab: ABNORMAL
POTASSIUM SERPL-SCNC: 3.7 MMOL/L (ref 3.7–5.3)
PRO-BNP: 1584 PG/ML
PROTHROMBIN TIME: 19.5 SEC (ref 9.7–11.6)
SODIUM BLD-SCNC: 143 MMOL/L (ref 135–144)
SPECIMEN DESCRIPTION: ABNORMAL

## 2019-11-24 PROCEDURE — 36415 COLL VENOUS BLD VENIPUNCTURE: CPT

## 2019-11-24 PROCEDURE — 85610 PROTHROMBIN TIME: CPT

## 2019-11-24 PROCEDURE — 6360000002 HC RX W HCPCS: Performed by: FAMILY MEDICINE

## 2019-11-24 PROCEDURE — 2580000003 HC RX 258: Performed by: NURSE PRACTITIONER

## 2019-11-24 PROCEDURE — 83880 ASSAY OF NATRIURETIC PEPTIDE: CPT

## 2019-11-24 PROCEDURE — 6370000000 HC RX 637 (ALT 250 FOR IP): Performed by: FAMILY MEDICINE

## 2019-11-24 PROCEDURE — 1200000000 HC SEMI PRIVATE

## 2019-11-24 PROCEDURE — 80048 BASIC METABOLIC PNL TOTAL CA: CPT

## 2019-11-24 PROCEDURE — 99232 SBSQ HOSP IP/OBS MODERATE 35: CPT | Performed by: FAMILY MEDICINE

## 2019-11-24 PROCEDURE — 6370000000 HC RX 637 (ALT 250 FOR IP): Performed by: NURSE PRACTITIONER

## 2019-11-24 PROCEDURE — 2580000003 HC RX 258: Performed by: FAMILY MEDICINE

## 2019-11-24 RX ADMIN — ATORVASTATIN CALCIUM 40 MG: 40 TABLET, FILM COATED ORAL at 10:01

## 2019-11-24 RX ADMIN — PREDNISONE 40 MG: 20 TABLET ORAL at 10:01

## 2019-11-24 RX ADMIN — FUROSEMIDE 40 MG: 40 TABLET ORAL at 17:37

## 2019-11-24 RX ADMIN — POTASSIUM CHLORIDE 20 MEQ: 20 TABLET, EXTENDED RELEASE ORAL at 10:02

## 2019-11-24 RX ADMIN — LISINOPRIL 5 MG: 5 TABLET ORAL at 10:02

## 2019-11-24 RX ADMIN — CITALOPRAM HYDROBROMIDE 20 MG: 20 TABLET ORAL at 10:02

## 2019-11-24 RX ADMIN — HYDRALAZINE HYDROCHLORIDE 10 MG: 10 TABLET, FILM COATED ORAL at 10:01

## 2019-11-24 RX ADMIN — CARVEDILOL 3.12 MG: 3.12 TABLET, FILM COATED ORAL at 10:01

## 2019-11-24 RX ADMIN — DONEPEZIL HYDROCHLORIDE 10 MG: 10 TABLET, FILM COATED ORAL at 22:10

## 2019-11-24 RX ADMIN — FUROSEMIDE 40 MG: 40 TABLET ORAL at 10:02

## 2019-11-24 RX ADMIN — PANTOPRAZOLE SODIUM 40 MG: 40 TABLET, DELAYED RELEASE ORAL at 05:55

## 2019-11-24 RX ADMIN — CEFTRIAXONE SODIUM 1 G: 1 INJECTION, POWDER, FOR SOLUTION INTRAMUSCULAR; INTRAVENOUS at 11:24

## 2019-11-24 RX ADMIN — HYDRALAZINE HYDROCHLORIDE 10 MG: 10 TABLET, FILM COATED ORAL at 22:10

## 2019-11-24 RX ADMIN — Medication 10 ML: at 22:14

## 2019-11-24 RX ADMIN — WARFARIN SODIUM 5 MG: 5 TABLET ORAL at 17:37

## 2019-11-24 RX ADMIN — CARVEDILOL 3.12 MG: 3.12 TABLET, FILM COATED ORAL at 17:37

## 2019-11-24 ASSESSMENT — ENCOUNTER SYMPTOMS
CHEST TIGHTNESS: 0
WHEEZING: 1
COUGH: 0
RHINORRHEA: 0
VOMITING: 0
NAUSEA: 0
ABDOMINAL PAIN: 0
SHORTNESS OF BREATH: 1
BLOOD IN STOOL: 0
DIARRHEA: 0
CONSTIPATION: 0

## 2019-11-24 ASSESSMENT — PAIN SCALES - GENERAL
PAINLEVEL_OUTOF10: 0

## 2019-11-25 VITALS
WEIGHT: 259.7 LBS | DIASTOLIC BLOOD PRESSURE: 62 MMHG | HEIGHT: 56 IN | HEART RATE: 72 BPM | TEMPERATURE: 98.1 F | BODY MASS INDEX: 58.42 KG/M2 | RESPIRATION RATE: 16 BRPM | SYSTOLIC BLOOD PRESSURE: 138 MMHG | OXYGEN SATURATION: 99 %

## 2019-11-25 LAB
ANION GAP SERPL CALCULATED.3IONS-SCNC: 3 MMOL/L (ref 9–17)
BUN BLDV-MCNC: 24 MG/DL (ref 8–23)
BUN/CREAT BLD: 28 (ref 9–20)
CALCIUM SERPL-MCNC: 8.8 MG/DL (ref 8.6–10.4)
CHLORIDE BLD-SCNC: 98 MMOL/L (ref 98–107)
CO2: 42 MMOL/L (ref 20–31)
CREAT SERPL-MCNC: 0.85 MG/DL (ref 0.5–0.9)
DIRECT EXAM: NORMAL
DIRECT EXAM: NORMAL
FIO2: 28
GFR AFRICAN AMERICAN: >60 ML/MIN
GFR NON-AFRICAN AMERICAN: >60 ML/MIN
GFR SERPL CREATININE-BSD FRML MDRD: ABNORMAL ML/MIN/{1.73_M2}
GFR SERPL CREATININE-BSD FRML MDRD: ABNORMAL ML/MIN/{1.73_M2}
GLUCOSE BLD-MCNC: 105 MG/DL (ref 70–99)
HCO3 VENOUS: 45.6 MMOL/L (ref 24–30)
INR BLD: 2.3
Lab: NORMAL
NEGATIVE BASE EXCESS, VEN: ABNORMAL (ref 0–2)
O2 DEVICE/FLOW/%: ABNORMAL
O2 SAT, VEN: 78 %
PATIENT TEMP: ABNORMAL
PCO2, VEN: 76 MM HG (ref 39–55)
PH VENOUS: 7.39 (ref 7.32–7.42)
PO2, VEN: 45 MM HG (ref 30–50)
POC PCO2 TEMP: ABNORMAL MM HG
POC PH TEMP: ABNORMAL
POC PO2 TEMP: ABNORMAL MM HG
POSITIVE BASE EXCESS, VEN: 21 (ref 0–2)
POTASSIUM SERPL-SCNC: 3.8 MMOL/L (ref 3.7–5.3)
PROTHROMBIN TIME: 23 SEC (ref 9.7–11.6)
SODIUM BLD-SCNC: 143 MMOL/L (ref 135–144)
SPECIMEN DESCRIPTION: NORMAL
TOTAL CO2, VENOUS: 48 MMOL/L (ref 25–31)

## 2019-11-25 PROCEDURE — 6370000000 HC RX 637 (ALT 250 FOR IP): Performed by: FAMILY MEDICINE

## 2019-11-25 PROCEDURE — 97535 SELF CARE MNGMENT TRAINING: CPT

## 2019-11-25 PROCEDURE — 99239 HOSP IP/OBS DSCHRG MGMT >30: CPT | Performed by: INTERNAL MEDICINE

## 2019-11-25 PROCEDURE — 2500000003 HC RX 250 WO HCPCS: Performed by: NURSE PRACTITIONER

## 2019-11-25 PROCEDURE — 85610 PROTHROMBIN TIME: CPT

## 2019-11-25 PROCEDURE — 6360000002 HC RX W HCPCS: Performed by: FAMILY MEDICINE

## 2019-11-25 PROCEDURE — 6370000000 HC RX 637 (ALT 250 FOR IP): Performed by: INTERNAL MEDICINE

## 2019-11-25 PROCEDURE — 36415 COLL VENOUS BLD VENIPUNCTURE: CPT

## 2019-11-25 PROCEDURE — 6370000000 HC RX 637 (ALT 250 FOR IP): Performed by: NURSE PRACTITIONER

## 2019-11-25 PROCEDURE — 80048 BASIC METABOLIC PNL TOTAL CA: CPT

## 2019-11-25 PROCEDURE — 82803 BLOOD GASES ANY COMBINATION: CPT

## 2019-11-25 PROCEDURE — 94640 AIRWAY INHALATION TREATMENT: CPT

## 2019-11-25 RX ORDER — CEPHALEXIN 250 MG/1
250 CAPSULE ORAL EVERY 6 HOURS SCHEDULED
Status: DISCONTINUED | OUTPATIENT
Start: 2019-11-25 | End: 2019-11-25 | Stop reason: HOSPADM

## 2019-11-25 RX ORDER — CEPHALEXIN 250 MG/1
250 CAPSULE ORAL 4 TIMES DAILY
Qty: 20 CAPSULE | Refills: 0 | DISCHARGE
Start: 2019-11-25 | End: 2019-11-30

## 2019-11-25 RX ORDER — CARVEDILOL 3.12 MG/1
3.12 TABLET ORAL 2 TIMES DAILY WITH MEALS
Qty: 60 TABLET | Refills: 3 | Status: SHIPPED | OUTPATIENT
Start: 2019-11-26

## 2019-11-25 RX ORDER — PREDNISONE 20 MG/1
40 TABLET ORAL DAILY
Qty: 10 TABLET | Refills: 0 | DISCHARGE
Start: 2019-11-26 | End: 2019-12-01

## 2019-11-25 RX ORDER — POTASSIUM CHLORIDE 20 MEQ/1
20 TABLET, EXTENDED RELEASE ORAL DAILY
Qty: 20 TABLET | Refills: 0 | Status: SHIPPED | OUTPATIENT
Start: 2019-11-25 | End: 2020-02-19

## 2019-11-25 RX ORDER — ALBUTEROL SULFATE 2.5 MG/3ML
2.5 SOLUTION RESPIRATORY (INHALATION) EVERY 4 HOURS PRN
Qty: 120 EACH | Refills: 3 | DISCHARGE
Start: 2019-11-25 | End: 2019-12-12 | Stop reason: SDUPTHER

## 2019-11-25 RX ORDER — LISINOPRIL 5 MG/1
5 TABLET ORAL DAILY
Qty: 30 TABLET | Refills: 3 | Status: SHIPPED | OUTPATIENT
Start: 2019-11-26

## 2019-11-25 RX ADMIN — POTASSIUM CHLORIDE 20 MEQ: 20 TABLET, EXTENDED RELEASE ORAL at 08:34

## 2019-11-25 RX ADMIN — ANTI-FUNGAL POWDER MICONAZOLE NITRATE TALC FREE: 1.42 POWDER TOPICAL at 08:41

## 2019-11-25 RX ADMIN — PREDNISONE 40 MG: 20 TABLET ORAL at 08:34

## 2019-11-25 RX ADMIN — CARVEDILOL 3.12 MG: 3.12 TABLET, FILM COATED ORAL at 16:42

## 2019-11-25 RX ADMIN — CEPHALEXIN 250 MG: 250 CAPSULE ORAL at 16:43

## 2019-11-25 RX ADMIN — CEPHALEXIN 250 MG: 250 CAPSULE ORAL at 13:59

## 2019-11-25 RX ADMIN — ALBUTEROL SULFATE 2.5 MG: 2.5 SOLUTION RESPIRATORY (INHALATION) at 13:59

## 2019-11-25 RX ADMIN — HYDRALAZINE HYDROCHLORIDE 10 MG: 10 TABLET, FILM COATED ORAL at 13:59

## 2019-11-25 RX ADMIN — ACETAMINOPHEN 650 MG: 325 TABLET ORAL at 09:13

## 2019-11-25 RX ADMIN — ATORVASTATIN CALCIUM 40 MG: 40 TABLET, FILM COATED ORAL at 08:34

## 2019-11-25 RX ADMIN — FUROSEMIDE 40 MG: 40 TABLET ORAL at 16:42

## 2019-11-25 RX ADMIN — FUROSEMIDE 40 MG: 40 TABLET ORAL at 08:34

## 2019-11-25 RX ADMIN — PANTOPRAZOLE SODIUM 40 MG: 40 TABLET, DELAYED RELEASE ORAL at 07:01

## 2019-11-25 RX ADMIN — HYDRALAZINE HYDROCHLORIDE 10 MG: 10 TABLET, FILM COATED ORAL at 08:34

## 2019-11-25 RX ADMIN — CITALOPRAM HYDROBROMIDE 20 MG: 20 TABLET ORAL at 08:34

## 2019-11-25 RX ADMIN — CARVEDILOL 3.12 MG: 3.12 TABLET, FILM COATED ORAL at 08:34

## 2019-11-25 RX ADMIN — WARFARIN SODIUM 5 MG: 5 TABLET ORAL at 16:43

## 2019-11-25 RX ADMIN — LISINOPRIL 5 MG: 5 TABLET ORAL at 08:34

## 2019-11-25 ASSESSMENT — PAIN DESCRIPTION - PROGRESSION: CLINICAL_PROGRESSION: NOT CHANGED

## 2019-11-25 ASSESSMENT — PAIN DESCRIPTION - ORIENTATION: ORIENTATION: UPPER

## 2019-11-25 ASSESSMENT — PAIN DESCRIPTION - DESCRIPTORS: DESCRIPTORS: HEADACHE

## 2019-11-25 ASSESSMENT — PAIN SCALES - GENERAL: PAINLEVEL_OUTOF10: 4

## 2019-11-25 ASSESSMENT — PAIN - FUNCTIONAL ASSESSMENT: PAIN_FUNCTIONAL_ASSESSMENT: ACTIVITIES ARE NOT PREVENTED

## 2019-11-25 ASSESSMENT — PAIN DESCRIPTION - ONSET: ONSET: ON-GOING

## 2019-11-25 ASSESSMENT — ENCOUNTER SYMPTOMS
VOMITING: 0
NAUSEA: 0
DIARRHEA: 0
COUGH: 1
SHORTNESS OF BREATH: 1

## 2019-11-25 ASSESSMENT — PAIN DESCRIPTION - FREQUENCY: FREQUENCY: CONTINUOUS

## 2019-11-25 ASSESSMENT — PAIN DESCRIPTION - LOCATION: LOCATION: HEAD

## 2019-11-25 ASSESSMENT — PAIN DESCRIPTION - PAIN TYPE: TYPE: ACUTE PAIN

## 2019-11-27 ENCOUNTER — TELEPHONE (OUTPATIENT)
Dept: PHARMACY | Age: 74
End: 2019-11-27

## 2019-11-27 ENCOUNTER — PRE-PROCEDURE TELEPHONE (OUTPATIENT)
Dept: WOUND CARE | Age: 74
End: 2019-11-27

## 2019-12-10 ENCOUNTER — TELEPHONE (OUTPATIENT)
Dept: FAMILY MEDICINE CLINIC | Age: 74
End: 2019-12-10

## 2019-12-12 ENCOUNTER — OFFICE VISIT (OUTPATIENT)
Dept: FAMILY MEDICINE CLINIC | Age: 74
End: 2019-12-12
Payer: MEDICARE

## 2019-12-12 VITALS
OXYGEN SATURATION: 97 % | WEIGHT: 238 LBS | DIASTOLIC BLOOD PRESSURE: 76 MMHG | BODY MASS INDEX: 53.54 KG/M2 | TEMPERATURE: 97 F | HEART RATE: 64 BPM | SYSTOLIC BLOOD PRESSURE: 132 MMHG | HEIGHT: 56 IN | RESPIRATION RATE: 16 BRPM

## 2019-12-12 DIAGNOSIS — I27.20 PULMONARY HYPERTENSION, MODERATE TO SEVERE (HCC): ICD-10-CM

## 2019-12-12 DIAGNOSIS — R09.02 HYPOXIA: ICD-10-CM

## 2019-12-12 DIAGNOSIS — J44.1 CHRONIC OBSTRUCTIVE PULMONARY DISEASE WITH ACUTE EXACERBATION (HCC): Primary | ICD-10-CM

## 2019-12-12 DIAGNOSIS — I10 ESSENTIAL HYPERTENSION: ICD-10-CM

## 2019-12-12 PROCEDURE — 3023F SPIROM DOC REV: CPT | Performed by: INTERNAL MEDICINE

## 2019-12-12 PROCEDURE — 99212 OFFICE O/P EST SF 10 MIN: CPT | Performed by: INTERNAL MEDICINE

## 2019-12-12 PROCEDURE — G8427 DOCREV CUR MEDS BY ELIG CLIN: HCPCS | Performed by: INTERNAL MEDICINE

## 2019-12-12 PROCEDURE — 1036F TOBACCO NON-USER: CPT | Performed by: INTERNAL MEDICINE

## 2019-12-12 PROCEDURE — 1123F ACP DISCUSS/DSCN MKR DOCD: CPT | Performed by: INTERNAL MEDICINE

## 2019-12-12 PROCEDURE — G8484 FLU IMMUNIZE NO ADMIN: HCPCS | Performed by: INTERNAL MEDICINE

## 2019-12-12 PROCEDURE — G8400 PT W/DXA NO RESULTS DOC: HCPCS | Performed by: INTERNAL MEDICINE

## 2019-12-12 PROCEDURE — 1111F DSCHRG MED/CURRENT MED MERGE: CPT | Performed by: INTERNAL MEDICINE

## 2019-12-12 PROCEDURE — 3017F COLORECTAL CA SCREEN DOC REV: CPT | Performed by: INTERNAL MEDICINE

## 2019-12-12 PROCEDURE — 1090F PRES/ABSN URINE INCON ASSESS: CPT | Performed by: INTERNAL MEDICINE

## 2019-12-12 PROCEDURE — G8926 SPIRO NO PERF OR DOC: HCPCS | Performed by: INTERNAL MEDICINE

## 2019-12-12 PROCEDURE — 4040F PNEUMOC VAC/ADMIN/RCVD: CPT | Performed by: INTERNAL MEDICINE

## 2019-12-12 PROCEDURE — G8417 CALC BMI ABV UP PARAM F/U: HCPCS | Performed by: INTERNAL MEDICINE

## 2019-12-12 RX ORDER — ALBUTEROL SULFATE 90 UG/1
2 AEROSOL, METERED RESPIRATORY (INHALATION) EVERY 6 HOURS PRN
Qty: 1 INHALER | Refills: 3 | Status: SHIPPED | OUTPATIENT
Start: 2019-12-12

## 2019-12-12 RX ORDER — ALBUTEROL SULFATE 2.5 MG/3ML
2.5 SOLUTION RESPIRATORY (INHALATION) EVERY 4 HOURS PRN
Qty: 120 EACH | Refills: 3 | Status: SHIPPED | OUTPATIENT
Start: 2019-12-12

## 2019-12-14 ASSESSMENT — ENCOUNTER SYMPTOMS
ABDOMINAL PAIN: 0
DIARRHEA: 0
CONSTIPATION: 0

## 2019-12-16 ENCOUNTER — TELEPHONE (OUTPATIENT)
Dept: FAMILY MEDICINE CLINIC | Age: 74
End: 2019-12-16

## 2019-12-16 ENCOUNTER — TELEPHONE (OUTPATIENT)
Dept: PHARMACY | Age: 74
End: 2019-12-16

## 2019-12-16 DIAGNOSIS — I48.20 CHRONIC ATRIAL FIBRILLATION (HCC): ICD-10-CM

## 2019-12-17 ENCOUNTER — TELEPHONE (OUTPATIENT)
Dept: PHARMACY | Age: 74
End: 2019-12-17

## 2019-12-17 ENCOUNTER — TELEPHONE (OUTPATIENT)
Dept: FAMILY MEDICINE CLINIC | Age: 74
End: 2019-12-17

## 2019-12-18 ENCOUNTER — APPOINTMENT (OUTPATIENT)
Dept: GENERAL RADIOLOGY | Age: 74
End: 2019-12-18
Payer: MEDICARE

## 2019-12-18 ENCOUNTER — HOSPITAL ENCOUNTER (EMERGENCY)
Age: 74
Discharge: HOME | End: 2019-12-18
Attending: EMERGENCY MEDICINE
Payer: MEDICARE

## 2019-12-18 VITALS
TEMPERATURE: 97.5 F | OXYGEN SATURATION: 96 % | DIASTOLIC BLOOD PRESSURE: 70 MMHG | HEART RATE: 68 BPM | HEIGHT: 56 IN | WEIGHT: 238 LBS | SYSTOLIC BLOOD PRESSURE: 112 MMHG | RESPIRATION RATE: 21 BRPM | BODY MASS INDEX: 53.54 KG/M2

## 2019-12-18 DIAGNOSIS — R53.1 GENERAL WEAKNESS: Primary | ICD-10-CM
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
## 2019-12-18 LAB
ABSOLUTE EOS #: 0.08 K/UL (ref 0–0.44)
ABSOLUTE IMMATURE GRANULOCYTE: 0 K/UL (ref 0–0.3)
ABSOLUTE LYMPH #: 0.44 K/UL (ref 1.1–3.7)
ABSOLUTE MONO #: 0.44 K/UL (ref 0.1–1.2)
ALBUMIN SERPL-MCNC: 3.5 G/DL (ref 3.5–5.2)
ALBUMIN/GLOBULIN RATIO: ABNORMAL (ref 1–2.5)
ALP BLD-CCNC: 144 U/L (ref 35–104)
ALT SERPL-CCNC: 10 U/L (ref 5–33)
ANION GAP SERPL CALCULATED.3IONS-SCNC: 9 MMOL/L (ref 9–17)
AST SERPL-CCNC: 11 U/L
BASOPHILS # BLD: 1 % (ref 0–2)
BASOPHILS ABSOLUTE: 0.04 K/UL (ref 0–0.2)
BILIRUB SERPL-MCNC: 0.57 MG/DL (ref 0.3–1.2)
BNP INTERPRETATION: ABNORMAL
BUN BLDV-MCNC: 14 MG/DL (ref 8–23)
BUN/CREAT BLD: 19 (ref 9–20)
CALCIUM SERPL-MCNC: 8.6 MG/DL (ref 8.6–10.4)
CHLORIDE BLD-SCNC: 96 MMOL/L (ref 98–107)
CO2: 34 MMOL/L (ref 20–31)
CREAT SERPL-MCNC: 0.73 MG/DL (ref 0.5–0.9)
DIFFERENTIAL TYPE: ABNORMAL
EOSINOPHILS RELATIVE PERCENT: 2 % (ref 1–4)
GFR AFRICAN AMERICAN: >60 ML/MIN
GFR NON-AFRICAN AMERICAN: >60 ML/MIN
GFR SERPL CREATININE-BSD FRML MDRD: ABNORMAL ML/MIN/{1.73_M2}
GFR SERPL CREATININE-BSD FRML MDRD: ABNORMAL ML/MIN/{1.73_M2}
GLUCOSE BLD-MCNC: 138 MG/DL (ref 70–99)
HCT VFR BLD CALC: 44.7 % (ref 36.3–47.1)
HEMOGLOBIN: 13.7 G/DL (ref 11.9–15.1)
IMMATURE GRANULOCYTES: 0 %
INR BLD: 2.7
LYMPHOCYTES # BLD: 11 % (ref 24–43)
MCH RBC QN AUTO: 30.6 PG (ref 25.2–33.5)
MCHC RBC AUTO-ENTMCNC: 30.6 G/DL (ref 28.4–34.8)
MCV RBC AUTO: 100 FL (ref 82.6–102.9)
MONOCYTES # BLD: 11 % (ref 3–12)
NRBC AUTOMATED: 0 PER 100 WBC
PARTIAL THROMBOPLASTIN TIME: 50.2 SEC (ref 23–31)
PDW BLD-RTO: 14.6 % (ref 11.8–14.4)
PLATELET # BLD: 169 K/UL (ref 138–453)
PLATELET ESTIMATE: ABNORMAL
PMV BLD AUTO: 10.8 FL (ref 8.1–13.5)
POTASSIUM SERPL-SCNC: 3.7 MMOL/L (ref 3.7–5.3)
PRO-BNP: 2875 PG/ML
PROTHROMBIN TIME: 26.8 SEC (ref 9.7–11.6)
RBC # BLD: 4.47 M/UL (ref 3.95–5.11)
RBC # BLD: ABNORMAL 10*6/UL
SEG NEUTROPHILS: 75 % (ref 36–65)
SEGMENTED NEUTROPHILS ABSOLUTE COUNT: 3 K/UL (ref 1.5–8.1)
SODIUM BLD-SCNC: 139 MMOL/L (ref 135–144)
TOTAL PROTEIN: 6.1 G/DL (ref 6.4–8.3)
TROPONIN INTERP: ABNORMAL
TROPONIN T: ABNORMAL NG/ML
TROPONIN, HIGH SENSITIVITY: 15 NG/L (ref 0–14)
TSH SERPL DL<=0.05 MIU/L-ACNC: 1.4 MIU/L (ref 0.3–5)
WBC # BLD: 4 K/UL (ref 3.5–11.3)
WBC # BLD: ABNORMAL 10*3/UL

## 2019-12-18 PROCEDURE — 84443 ASSAY THYROID STIM HORMONE: CPT

## 2019-12-18 PROCEDURE — 84484 ASSAY OF TROPONIN QUANT: CPT

## 2019-12-18 PROCEDURE — 71045 X-RAY EXAM CHEST 1 VIEW: CPT

## 2019-12-18 PROCEDURE — 93005 ELECTROCARDIOGRAM TRACING: CPT | Performed by: EMERGENCY MEDICINE

## 2019-12-18 PROCEDURE — 85730 THROMBOPLASTIN TIME PARTIAL: CPT

## 2019-12-18 PROCEDURE — 83880 ASSAY OF NATRIURETIC PEPTIDE: CPT

## 2019-12-18 PROCEDURE — 72170 X-RAY EXAM OF PELVIS: CPT

## 2019-12-18 PROCEDURE — 85025 COMPLETE CBC W/AUTO DIFF WBC: CPT

## 2019-12-18 PROCEDURE — 99284 EMERGENCY DEPT VISIT MOD MDM: CPT

## 2019-12-18 PROCEDURE — 85610 PROTHROMBIN TIME: CPT

## 2019-12-18 PROCEDURE — 80053 COMPREHEN METABOLIC PANEL: CPT

## 2019-12-18 RX ORDER — IPRATROPIUM BROMIDE AND ALBUTEROL SULFATE 2.5; .5 MG/3ML; MG/3ML
1 SOLUTION RESPIRATORY (INHALATION) EVERY 4 HOURS PRN
Qty: 360 ML | Refills: 5 | Status: SHIPPED | OUTPATIENT
Start: 2019-12-18

## 2019-12-18 ASSESSMENT — ENCOUNTER SYMPTOMS
SHORTNESS OF BREATH: 0
VOMITING: 0
DIARRHEA: 0
ABDOMINAL PAIN: 0

## 2019-12-19 ENCOUNTER — TELEPHONE (OUTPATIENT)
Dept: OTHER | Facility: CLINIC | Age: 74
End: 2019-12-19

## 2019-12-19 LAB
EKG ATRIAL RATE: 64 BPM
EKG Q-T INTERVAL: 422 MS
EKG QRS DURATION: 114 MS
EKG QTC CALCULATION (BAZETT): 464 MS
EKG R AXIS: -48 DEGREES
EKG T AXIS: -2 DEGREES
EKG VENTRICULAR RATE: 73 BPM

## 2019-12-19 PROCEDURE — 93010 ELECTROCARDIOGRAM REPORT: CPT | Performed by: INTERNAL MEDICINE

## 2020-01-02 ENCOUNTER — OFFICE VISIT (OUTPATIENT)
Dept: NEUROLOGY | Age: 75
End: 2020-01-02
Payer: MEDICARE

## 2020-01-02 VITALS
HEIGHT: 57 IN | DIASTOLIC BLOOD PRESSURE: 65 MMHG | BODY MASS INDEX: 52.21 KG/M2 | HEART RATE: 77 BPM | SYSTOLIC BLOOD PRESSURE: 124 MMHG | WEIGHT: 242 LBS

## 2020-01-02 PROCEDURE — 1123F ACP DISCUSS/DSCN MKR DOCD: CPT | Performed by: PSYCHIATRY & NEUROLOGY

## 2020-01-02 PROCEDURE — 3017F COLORECTAL CA SCREEN DOC REV: CPT | Performed by: PSYCHIATRY & NEUROLOGY

## 2020-01-02 PROCEDURE — G8484 FLU IMMUNIZE NO ADMIN: HCPCS | Performed by: PSYCHIATRY & NEUROLOGY

## 2020-01-02 PROCEDURE — 99204 OFFICE O/P NEW MOD 45 MIN: CPT | Performed by: PSYCHIATRY & NEUROLOGY

## 2020-01-02 PROCEDURE — G8427 DOCREV CUR MEDS BY ELIG CLIN: HCPCS | Performed by: PSYCHIATRY & NEUROLOGY

## 2020-01-02 PROCEDURE — 4040F PNEUMOC VAC/ADMIN/RCVD: CPT | Performed by: PSYCHIATRY & NEUROLOGY

## 2020-01-02 PROCEDURE — G8400 PT W/DXA NO RESULTS DOC: HCPCS | Performed by: PSYCHIATRY & NEUROLOGY

## 2020-01-02 PROCEDURE — 1090F PRES/ABSN URINE INCON ASSESS: CPT | Performed by: PSYCHIATRY & NEUROLOGY

## 2020-01-02 PROCEDURE — 1036F TOBACCO NON-USER: CPT | Performed by: PSYCHIATRY & NEUROLOGY

## 2020-01-02 PROCEDURE — G8417 CALC BMI ABV UP PARAM F/U: HCPCS | Performed by: PSYCHIATRY & NEUROLOGY

## 2020-01-02 RX ORDER — MEMANTINE HYDROCHLORIDE 5 MG/1
TABLET ORAL
Qty: 30 TABLET | Refills: 0 | Status: SHIPPED | OUTPATIENT
Start: 2020-01-02 | End: 2020-02-19

## 2020-01-02 RX ORDER — MEMANTINE HYDROCHLORIDE 5 MG/1
5 TABLET ORAL 2 TIMES DAILY
Qty: 180 TABLET | Refills: 1 | Status: SHIPPED | OUTPATIENT
Start: 2020-02-03

## 2020-01-02 RX ORDER — CLOPIDOGREL BISULFATE 75 MG/1
75 TABLET ORAL DAILY
COMMUNITY
End: 2020-04-02 | Stop reason: ALTCHOICE

## 2020-01-02 NOTE — PROGRESS NOTES
NEUROLOGY CONSULT  Patient Name:       Mariama Vazquez  :          Clinic Visit Date:    2020    Dear Dr. Ambrocio Phelps, DO     I had the opportunity to see your patient, Ms. Mariama Vazquez in neurology consultation today. As you know she  is a 69 y.o. right handed  female was brought to the clinic by her son for evaluation of progressive memory loss. She also has been having increasing bladder incontinence without any symptomatology to suggest urosepsis. Most of the history with regards to symptoms is obtained from her son, Mr. Melany Mckoy,   She had memory problems since  also and those had been slowly getting worse. She has been on Aricept since the spring of 2018. In spite of that; for the last 1 year her \"memory difficulties worsened\" and she had been \"increasingly dependent on basic ADLs\"  also. She is completely dependent on instrumental ADLs such as preparing meals, ability to do any housework, etc. she has not been driving since . Family members are helping her to manage money and also for medication administration. She has history of depression and she is on Celexa 20 mg daily and depression has been stable. She has not had any hallucinations or delusions associated with progressive memory loss. Family members think that she has had strokes in the past at the time of cardioversion for A. Fib. He is presently on Eliquis 5 mg bid for A Fib. 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 Review of systems done by staff reviewed and pertinent positives include urinary frequency, urinary urgency, memory loss.     Current Outpatient Medications on File Prior to Visit   Medication Sig Dispense Refill    clopidogrel (PLAVIX) 75 MG tablet Take 75 mg by mouth daily      apixaban (ELIQUIS) 5 MG TABS tablet Take 1 tablet by mouth 2 times daily (Patient taking differently: Take 5 mg by mouth daily ) 60 tablet 5    Nebulizers  Dementia ([address]     Essential hypertension     Lymphedema of both lower extremities     Memory loss     Stroke [facility])     Vascular dementia [facility])        Past Surgical History:   Procedure Laterality Date     SECTION      HYSTERECTOMY      KNEE ARTHROPLASTY       Social History: Jacinto Jim  reports that she has never smoked. She has never used smokeless tobacco. She reports that she does not drink alcohol or use drugs. Family History   Problem Relation Age of Onset    Cancer Mother     Cancer Father        On exam: Blood pressure 124/65, pulse 77, height 4' 9\" (1.448 m), weight 242 lb (109.8 kg). GENERAL  Appears comfortable and in no distress   HEENT  NC/ AT   NECK  Supple and no bruits heard   MENTAL STATUS:  Alert, oriented, poor immediate recall; poor naming good repetition; impaired long-term memory; able to follow three-step commands; scored 17/30 on MMSE. No hallucination or delusion   CRANIAL NERVES: II     -      PERRLA, blind in left eye and intact right visual field   III,IV,VI -  EOMs full, no afferent defect, no  TERESA, no ptosis  V     -     Normal facial sensation  VII    -     Normal facial symmetry  VIII   -     Intact hearing  IX,X -     Symmetrical palate  XI    -     Symmetrical shoulder shrug  XII   -     Midline tongue, no atrophy    MOTOR FUNCTION:  significant for good strength of grade 5/5 in bilateral proximal and distal muscle groups of both upper and lower extremities with normal bulk, normal tone and no involuntary movements, no tremor   SENSORY FUNCTION:  Normal touch, normal pin prick in all 4 extre   CEREBELLAR FUNCTION:  Intact fine motor control over upper limbs   REFLEX FUNCTION:  Symmetric, no perverted reflex, no Babinski sign   STATION and GAIT  Normal station and able to walk using walker. 2020  Maximum score 5 Score 2 What is the year, season, date, day, month   Maximum score 5 Score 1 Where are we: State, county, town, hospital, floor? Maximum score 3 Score 3 Name 3 objects: ball, pen, car. Ask patient to repeat 3 objects. Maximum score 5 Score 3 Serial sevens from 100:93, 86, 79, 72, 65   Maximum score 3 Score 1 Recall 3 objects   Maximum score 2 Score 2 Name a pencil and watch. Maximum score 1 Score 1 Repeat the following: No ifs ands or buts.      Maximum score 3 Score 3 Follow 3 stage command take a paper in your hand, fold it in half, and put it on the floor. Maximum score 1  Score 1 Read and obey the following: Close your eyes     Maximum score 1 Score 0 Write a sentence. Maximum score 1 Score 0 Copy a design below. Max 30   Patients score 17    CT Head 10/01/19: No acute intracranial abnormality. 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 Mild, <50% stenosis of bilateral ICAs. No intracranial flow-limiting stenosis. Impression and Plan: Ms. Anish Henry is a 69 y.o. female with   Progressive memory loss in spite of being on daily donepezil since spring 2018  Comorbid affective disorder with depression: Stable on Celexa  History of left eye blindness for many years  History of \"strokes\" at the time of cardioversion for atrial fibrillation  Chronic A. fib: Stable on Eliquis    will get MRI brain in further eval; also to get EEG to rule out any reversible etiologies. Will also start her on Namenda 5 mg qpm x4 wks then bid. Medication Counseling: risks and benefits including possible adverse effects discussed with the patient and she verbalized understanding those instructions.     Incidental aneurysm noted on CTA head and neck; will refer to Endovascular Neurologist/ Neurointerventionalist, Dr. Gerardo Failing  Follow-up in 2-3 months        Please note that portions of this note were completed with a voice recognition program.  Although every effort was made to ensure the accuracy of this  automated transcription, some errors in transcription may have occurred, occasionally words are mis-transcribed.

## 2020-01-06 ENCOUNTER — TELEPHONE (OUTPATIENT)
Dept: FAMILY MEDICINE CLINIC | Age: 75
End: 2020-01-06

## 2020-01-06 NOTE — TELEPHONE ENCOUNTER
Mariana Nguyen from [facility] called- patient's son is going out of town for 5 days and is wanting to check patient into [facility]. They are requesting an admit order, facesheet, dx list, med list and an H&P. They need to be signed by you also.  Their fax # is [phone]

## 2020-01-07 NOTE — TELEPHONE ENCOUNTER
Pt son called in and he was completely rude and yelling at me. He demanded we send over everything that we have to [facility]. I explained that there are orders that need to be wrote and pt has not been seen her since middle of last month due to no showing the last 2 apts. Had they come to the last apt on the 3rd we could have taken care of all this at that time. Dr. Margarita Aldana is in with other patients and it will take some time to get this done.

## 2020-01-07 NOTE — TELEPHONE ENCOUNTER
I faxed all stuff to [facility]. Called to confirm fax. Spoke with Marie Astorga at [facility] she said that he was canceling his flight and driving pt down with him.

## 2020-01-08 ENCOUNTER — TELEPHONE (OUTPATIENT)
Dept: NEUROLOGY | Age: 75
End: 2020-01-08

## 2020-01-08 NOTE — TELEPHONE ENCOUNTER
A call was placed to the patient's son Edmar Castillo. It went to voice mail. A message was left asking for a return call.

## 2020-01-15 ENCOUNTER — TELEPHONE (OUTPATIENT)
Dept: FAMILY MEDICINE CLINIC | Age: 75
End: 2020-01-15

## 2020-01-15 NOTE — TELEPHONE ENCOUNTER
Bethany Abreu from [facility] calling she needs verbal for one more PT visit

## 2020-01-23 ENCOUNTER — HOSPITAL ENCOUNTER (OUTPATIENT)
Dept: MRI IMAGING | Age: 75
Discharge: HOME | End: 2020-01-25
Payer: MEDICARE

## 2020-01-23 ENCOUNTER — HOSPITAL ENCOUNTER (OUTPATIENT)
Dept: NEUROLOGY | Age: 75
Discharge: HOME | End: 2020-01-23
Payer: MEDICARE

## 2020-01-23 PROCEDURE — 95816 EEG AWAKE AND DROWSY: CPT

## 2020-01-23 PROCEDURE — 70551 MRI BRAIN STEM W/O DYE: CPT

## 2020-01-23 PROCEDURE — 95819 EEG AWAKE AND ASLEEP: CPT | Performed by: PSYCHIATRY & NEUROLOGY

## 2020-01-24 NOTE — PROCEDURES
[address] [facility] [address]                [address] [place] [id number]                          ELECTROENCEPHALOGRAM REPORT    PATIENT NAME: Randy Paris                      :        1945  MED REC NO:   [id number]                             ROOM:  ACCOUNT NO:   [de-identified]                           ADMIT DATE: 2020  PROVIDER:     Karie Elder    DATE OF EE2020    ATTENDING OF RECORD:  Traci Mccabe MD    REASON FOR STUDY:  This is a 81-year-old lady with memory loss. MEDICATIONS:  Include Plavix, Namenda, Eliquis, Proventil, K-Koki, and  Coreg. INTERPRETATION:  This is an 18-channel EEG with one EKG channel  recording performed on a patient described to be awake, drowsy, and  asleep. The patient shows delayed waking rhythms. Background activity  consists of poorly-regulated 8 Hz activity in the 40-60 microvolt range,  more prominent over the posterior head area, showing some reactivity to  eye opening and closing. Over the anterior head regions, there are  15-20 Hz activity in 20-30 microvolt range. The record is prominent for  mild degree of medium-amplitude 4-7 Hz activity seen throughout all head  areas during waking state. With drowsiness and sleep, there is further  intrusion of slower frequencies in a theta and lesser degree a delta  band accompanied by vertex wave activity and sleep spindles. This  record is not lateralized or epileptiform. Hyperventilation is not  performed. Photic stimulation shows no change of the record. IMPRESSION:  1. This EEG shows the presence of mild diffuse slowing. 2. This EEG is concordant with diffuse encephalopathy. 3. No clear lateralized or epileptiform disturbance is seen.         Humaira Stafford    D: 2020 12:51:03       T: 2020 13:19:18     [email or web address]  Job#: [id number]     Doc#: [id number]    CC:  DO Traci Bangura

## 2020-01-28 ENCOUNTER — TELEPHONE (OUTPATIENT)
Dept: FAMILY MEDICINE CLINIC | Age: 75
End: 2020-01-28

## 2020-02-04 ENCOUNTER — TELEPHONE (OUTPATIENT)
Dept: FAMILY MEDICINE CLINIC | Age: 75
End: 2020-02-04

## 2020-02-14 ENCOUNTER — OFFICE VISIT (OUTPATIENT)
Dept: FAMILY MEDICINE CLINIC | Age: 75
End: 2020-02-14
Payer: MEDICARE

## 2020-02-14 ENCOUNTER — HOSPITAL ENCOUNTER (OUTPATIENT)
Age: 75
Setting detail: SPECIMEN
Discharge: HOME | End: 2020-02-14
Payer: MEDICARE

## 2020-02-14 VITALS
HEART RATE: 70 BPM | TEMPERATURE: 97.5 F | OXYGEN SATURATION: 95 % | DIASTOLIC BLOOD PRESSURE: 84 MMHG | SYSTOLIC BLOOD PRESSURE: 124 MMHG

## 2020-02-14 LAB
BILIRUBIN, POC: ABNORMAL
BLOOD URINE, POC: ABNORMAL
CLARITY, POC: CLEAR
COLOR, POC: ABNORMAL
GLUCOSE URINE, POC: ABNORMAL
KETONES, POC: ABNORMAL
LEUKOCYTE EST, POC: ABNORMAL
NITRITE, POC: ABNORMAL
PH, POC: 5.5
PROTEIN, POC: 30
SPECIFIC GRAVITY, POC: 1.03
UROBILINOGEN, POC: 2

## 2020-02-14 PROCEDURE — G8484 FLU IMMUNIZE NO ADMIN: HCPCS | Performed by: NURSE PRACTITIONER

## 2020-02-14 PROCEDURE — 1090F PRES/ABSN URINE INCON ASSESS: CPT | Performed by: NURSE PRACTITIONER

## 2020-02-14 PROCEDURE — G8400 PT W/DXA NO RESULTS DOC: HCPCS | Performed by: NURSE PRACTITIONER

## 2020-02-14 PROCEDURE — 4040F PNEUMOC VAC/ADMIN/RCVD: CPT | Performed by: NURSE PRACTITIONER

## 2020-02-14 PROCEDURE — G8417 CALC BMI ABV UP PARAM F/U: HCPCS | Performed by: NURSE PRACTITIONER

## 2020-02-14 PROCEDURE — 1036F TOBACCO NON-USER: CPT | Performed by: NURSE PRACTITIONER

## 2020-02-14 PROCEDURE — 99213 OFFICE O/P EST LOW 20 MIN: CPT | Performed by: NURSE PRACTITIONER

## 2020-02-14 PROCEDURE — G8427 DOCREV CUR MEDS BY ELIG CLIN: HCPCS | Performed by: NURSE PRACTITIONER

## 2020-02-14 PROCEDURE — 81003 URINALYSIS AUTO W/O SCOPE: CPT | Performed by: NURSE PRACTITIONER

## 2020-02-14 PROCEDURE — 3017F COLORECTAL CA SCREEN DOC REV: CPT | Performed by: NURSE PRACTITIONER

## 2020-02-14 PROCEDURE — 1123F ACP DISCUSS/DSCN MKR DOCD: CPT | Performed by: NURSE PRACTITIONER

## 2020-02-14 RX ORDER — NITROFURANTOIN 25; 75 MG/1; MG/1
100 CAPSULE ORAL 2 TIMES DAILY
Qty: 14 CAPSULE | Refills: 0 | Status: ON HOLD | OUTPATIENT
Start: 2020-02-14 | End: 2020-02-22 | Stop reason: HOSPADM

## 2020-02-14 ASSESSMENT — ENCOUNTER SYMPTOMS
NAUSEA: 0
VOMITING: 0

## 2020-02-14 NOTE — PATIENT INSTRUCTIONS
from front to back. When should you call for help? Call your doctor now or seek immediate medical care if:    · Symptoms such as fever, chills, nausea, or vomiting get worse or appear for the first time.     · You have new pain in your back just below your rib cage. This is called flank pain.     · There is new blood or pus in your urine.     · You have any problems with your antibiotic medicine.    Watch closely for changes in your health, and be sure to contact your doctor if:    · You are not getting better after taking an antibiotic for 2 days.     · Your symptoms go away but then come back. Where can you learn more? Go to [email or web address] and sign in to your [employer] account. Enter D124 in the Mind [employer] box to learn more about \"Urinary Tract Infection in Women: Care Instructions. \"     If you do not have an account, please click on the \"Sign Up Now\" link. Current as of: August 21, 2019  Content Version: 12.3  © 4562-7783 Healthwise, Incorporated. Care instructions adapted under license by [facility] ([facility]). If you have questions about a medical condition or this instruction, always ask your healthcare professional. [place] [address] any warranty or liability for your use of this information.

## 2020-02-14 NOTE — PROGRESS NOTES
[address] [facility]  [address] [place] [address] [address] [address]  Dept: [phone]  Dept Fax: [phone]    iBrdie Toussaint is a 69 y.o. female who presents to the urgent care today for her medical conditions/complaints as notedbelow. Birdie Toussaint is c/o of Urinary Tract Infection (confusion, frequency, strong odor to urine, chills - started last wk)      HPI:     69 year ofl female presents with urinary frequency, urgency, strong odor to urine, few chills. Sx started within the week. Hx uti's. None in last cpl months. Urinary Tract Infection    This is a new problem. The current episode started in the past 7 days. The problem occurs every urination. The problem has been gradually worsening. The pain is at a severity of 0/10. The patient is experiencing no pain. There has been no fever. She is not sexually active. Associated symptoms include chills, frequency, hesitancy and urgency. Pertinent negatives include no discharge, flank pain, hematuria, nausea, possible pregnancy, sweats or vomiting. She has tried nothing for the symptoms. The treatment provided no relief. Her past medical history is significant for recurrent UTIs.        Past Medical History:   Diagnosis Date    A-fib [facility])     CHF (congestive heart failure) ([facility])     Dementia ([facility])     Dementia ([facility])     Essential hypertension     Lymphedema of both lower extremities     Memory loss     [facility]     Vascular dementia ([facility])         Current Outpatient Medications   Medication Sig Dispense Refill    nitrofurantoin, macrocrystal-monohydrate, (MACROBID) 100 MG capsule Take 1 capsule by mouth 2 times daily for 7 days 14 capsule 0    clopidogrel (PLAVIX) 75 MG tablet Take 75 mg by mouth daily      memantine (NAMENDA) 5 MG tablet Take 1 tab every evening 30 tablet 0    memantine (NAMENDA) 5 MG tablet Take 1 tablet by mouth 2 times daily 180 tablet 1    apixaban (ELIQUIS) 5 MG TABS tablet Sitting, Cuff Size: Large Adult)   Pulse 70   Temp 97.5 °F (36.4 °C) (Oral)   SpO2 95%     Assessment:       Diagnosis Orders   1. UTI symptoms  Urine Culture   2. Urination frequency  POCT Urinalysis No Micro (Auto)    Urine Culture   3. Other microscopic hematuria         Plan:      Results for POC orders placed in visit on 02/14/20   POCT Urinalysis No Micro (Auto)   Result Value Ref Range    Color, UA dark yellow     Clarity, UA clear     Glucose, UA POC neg     Bilirubin, UA small     Ketones, UA neg     Spec Grav, UA 1.030     Blood, UA POC trace     pH, UA 5.5     Protein, UA POC 30     Urobilinogen, UA 2.0     Leukocytes, UA neg     Nitrite, UA neg    Urine dip positive trace blood protein small bilirubin. Based on symptoms and patient history will treat as UTI  Macrobid prescription (last few cultures + e. Coli, sensitive to macrobid)  Urine culture pending we will call only if need to change antibiotic  Reason for return and close follow-up were reviewed with patient and son. He verbalizes agreement and understanding. Return for make appt for urine recheck after antibiotics done. Orders Placed This Encounter   Medications    nitrofurantoin, macrocrystal-monohydrate, (MACROBID) 100 MG capsule     Sig: Take 1 capsule by mouth 2 times daily for 7 days     Dispense:  14 capsule     Refill:  0         Patient given educational materials - see patient instructions. Discussed use, benefit, and side effects of prescribed medications. All patient questions answered. Pt voicedunderstanding.     Electronically signed by KENNETH Mcwilliams CNP on 2/14/2020 at 5:39 PM

## 2020-02-17 LAB
CULTURE: NORMAL
Lab: NORMAL
SPECIMEN DESCRIPTION: NORMAL

## 2020-02-19 ENCOUNTER — APPOINTMENT (OUTPATIENT)
Dept: CT IMAGING | Age: 75
DRG: 291 | End: 2020-02-19
Payer: MEDICARE

## 2020-02-19 ENCOUNTER — HOSPITAL ENCOUNTER (INPATIENT)
Age: 75
LOS: 4 days | Discharge: NURSING, REHAB OR LONG-TERM CARE | DRG: 291 | End: 2020-02-23
Attending: EMERGENCY MEDICINE | Admitting: INTERNAL MEDICINE
Payer: MEDICARE

## 2020-02-19 ENCOUNTER — APPOINTMENT (OUTPATIENT)
Dept: GENERAL RADIOLOGY | Age: 75
DRG: 291 | End: 2020-02-19
Payer: MEDICARE

## 2020-02-19 PROBLEM — J96.92 HYPERCAPNIC RESPIRATORY FAILURE (HCC): Status: ACTIVE | Noted: 2020-02-19

## 2020-02-19 LAB
-: ABNORMAL
ABSOLUTE EOS #: 0.1 K/UL (ref 0–0.44)
ABSOLUTE IMMATURE GRANULOCYTE: 0.01 K/UL (ref 0–0.3)
ABSOLUTE LYMPH #: 0.71 K/UL (ref 1.1–3.7)
ABSOLUTE MONO #: 0.52 K/UL (ref 0.1–1.2)
AMORPHOUS: ABNORMAL
ANION GAP SERPL CALCULATED.3IONS-SCNC: 9 MMOL/L (ref 9–17)
BACTERIA: ABNORMAL
BASOPHILS # BLD: 1 % (ref 0–2)
BASOPHILS ABSOLUTE: 0.05 K/UL (ref 0–0.2)
BILIRUBIN URINE: NEGATIVE
BNP INTERPRETATION: ABNORMAL
BUN BLDV-MCNC: 12 MG/DL (ref 8–23)
BUN/CREAT BLD: 16 (ref 9–20)
CALCIUM SERPL-MCNC: 8.9 MG/DL (ref 8.6–10.4)
CASTS UA: ABNORMAL /LPF
CHLORIDE BLD-SCNC: 98 MMOL/L (ref 98–107)
CO2: 35 MMOL/L (ref 20–31)
COLOR: YELLOW
COMMENT UA: ABNORMAL
CREAT SERPL-MCNC: 0.74 MG/DL (ref 0.5–0.9)
CRYSTALS, UA: ABNORMAL /HPF
DIFFERENTIAL TYPE: ABNORMAL
EOSINOPHILS RELATIVE PERCENT: 2 % (ref 1–4)
EPITHELIAL CELLS UA: ABNORMAL /HPF (ref 0–5)
FIO2: 31
GFR AFRICAN AMERICAN: >60 ML/MIN
GFR NON-AFRICAN AMERICAN: >60 ML/MIN
GFR SERPL CREATININE-BSD FRML MDRD: ABNORMAL ML/MIN/{1.73_M2}
GFR SERPL CREATININE-BSD FRML MDRD: ABNORMAL ML/MIN/{1.73_M2}
GLUCOSE BLD-MCNC: 91 MG/DL (ref 70–99)
GLUCOSE URINE: NEGATIVE
HCT VFR BLD CALC: 46.3 % (ref 36.3–47.1)
HEMOGLOBIN: 14 G/DL (ref 11.9–15.1)
IMMATURE GRANULOCYTES: 0 %
INR BLD: 3.9
KETONES, URINE: NEGATIVE
LACTIC ACID: 0.9 MMOL/L (ref 0.5–2.2)
LACTIC ACID: 1.1 MMOL/L (ref 0.5–2.2)
LEUKOCYTE ESTERASE, URINE: NEGATIVE
LYMPHOCYTES # BLD: 11 % (ref 24–43)
MAGNESIUM: 1.8 MG/DL (ref 1.6–2.6)
MCH RBC QN AUTO: 31.5 PG (ref 25.2–33.5)
MCHC RBC AUTO-ENTMCNC: 30.2 G/DL (ref 28.4–34.8)
MCV RBC AUTO: 104 FL (ref 82.6–102.9)
MONOCYTES # BLD: 8 % (ref 3–12)
MUCUS: ABNORMAL
NEGATIVE BASE EXCESS, ART: ABNORMAL (ref 0–2)
NITRITE, URINE: NEGATIVE
NRBC AUTOMATED: 0 PER 100 WBC
O2 DEVICE/FLOW/%: ABNORMAL
OTHER OBSERVATIONS UA: ABNORMAL
PARTIAL THROMBOPLASTIN TIME: 40.1 SEC (ref 23–31)
PATIENT TEMP: ABNORMAL
PDW BLD-RTO: 15.1 % (ref 11.8–14.4)
PH UA: 7 (ref 5–8)
PLATELET # BLD: 223 K/UL (ref 138–453)
PLATELET ESTIMATE: ABNORMAL
PMV BLD AUTO: 10.9 FL (ref 8.1–13.5)
POC HCO3: 36.3 MMOL/L (ref 22–27)
POC O2 SATURATION: 91 %
POC PCO2 TEMP: ABNORMAL MM HG
POC PCO2: 69 MM HG (ref 32–45)
POC PH TEMP: ABNORMAL
POC PH: 7.33 (ref 7.35–7.45)
POC PO2 TEMP: ABNORMAL MM HG
POC PO2: 68 MM HG (ref 75–95)
POSITIVE BASE EXCESS, ART: 10 (ref 0–2)
POTASSIUM SERPL-SCNC: 4 MMOL/L (ref 3.7–5.3)
PRO-BNP: 1998 PG/ML
PROTEIN UA: NEGATIVE
PROTHROMBIN TIME: 38.1 SEC (ref 9.7–11.6)
RBC # BLD: 4.45 M/UL (ref 3.95–5.11)
RBC # BLD: ABNORMAL 10*6/UL
RBC UA: ABNORMAL /HPF (ref 0–2)
RENAL EPITHELIAL, UA: ABNORMAL /HPF
SEG NEUTROPHILS: 78 % (ref 36–65)
SEGMENTED NEUTROPHILS ABSOLUTE COUNT: 4.93 K/UL (ref 1.5–8.1)
SODIUM BLD-SCNC: 142 MMOL/L (ref 135–144)
SPECIFIC GRAVITY UA: 1.01 (ref 1–1.03)
TCO2 (CALC), ART: 38 MMOL/L (ref 23–28)
TRICHOMONAS: ABNORMAL
TROPONIN INTERP: NORMAL
TROPONIN INTERP: NORMAL
TROPONIN T: NORMAL NG/ML
TROPONIN T: NORMAL NG/ML
TROPONIN, HIGH SENSITIVITY: 10 NG/L (ref 0–14)
TROPONIN, HIGH SENSITIVITY: 9 NG/L (ref 0–14)
TURBIDITY: CLEAR
URINE HGB: ABNORMAL
UROBILINOGEN, URINE: NORMAL
WBC # BLD: 6.3 K/UL (ref 3.5–11.3)
WBC # BLD: ABNORMAL 10*3/UL
WBC UA: ABNORMAL /HPF (ref 0–5)
YEAST: ABNORMAL

## 2020-02-19 PROCEDURE — 2500000003 HC RX 250 WO HCPCS: Performed by: NURSE PRACTITIONER

## 2020-02-19 PROCEDURE — 85730 THROMBOPLASTIN TIME PARTIAL: CPT

## 2020-02-19 PROCEDURE — 82803 BLOOD GASES ANY COMBINATION: CPT

## 2020-02-19 PROCEDURE — 85025 COMPLETE CBC W/AUTO DIFF WBC: CPT

## 2020-02-19 PROCEDURE — 87040 BLOOD CULTURE FOR BACTERIA: CPT

## 2020-02-19 PROCEDURE — 81001 URINALYSIS AUTO W/SCOPE: CPT

## 2020-02-19 PROCEDURE — 36600 WITHDRAWAL OF ARTERIAL BLOOD: CPT

## 2020-02-19 PROCEDURE — 96374 THER/PROPH/DIAG INJ IV PUSH: CPT

## 2020-02-19 PROCEDURE — 83735 ASSAY OF MAGNESIUM: CPT

## 2020-02-19 PROCEDURE — 2580000003 HC RX 258: Performed by: EMERGENCY MEDICINE

## 2020-02-19 PROCEDURE — 85610 PROTHROMBIN TIME: CPT

## 2020-02-19 PROCEDURE — 94660 CPAP INITIATION&MGMT: CPT

## 2020-02-19 PROCEDURE — 84484 ASSAY OF TROPONIN QUANT: CPT

## 2020-02-19 PROCEDURE — 2000000000 HC ICU R&B

## 2020-02-19 PROCEDURE — 6370000000 HC RX 637 (ALT 250 FOR IP): Performed by: NURSE PRACTITIONER

## 2020-02-19 PROCEDURE — 71260 CT THORAX DX C+: CPT

## 2020-02-19 PROCEDURE — 2580000003 HC RX 258: Performed by: NURSE PRACTITIONER

## 2020-02-19 PROCEDURE — 6360000002 HC RX W HCPCS: Performed by: EMERGENCY MEDICINE

## 2020-02-19 PROCEDURE — 93005 ELECTROCARDIOGRAM TRACING: CPT | Performed by: EMERGENCY MEDICINE

## 2020-02-19 PROCEDURE — 71045 X-RAY EXAM CHEST 1 VIEW: CPT

## 2020-02-19 PROCEDURE — 83880 ASSAY OF NATRIURETIC PEPTIDE: CPT

## 2020-02-19 PROCEDURE — 99285 EMERGENCY DEPT VISIT HI MDM: CPT

## 2020-02-19 PROCEDURE — 83605 ASSAY OF LACTIC ACID: CPT

## 2020-02-19 PROCEDURE — 80048 BASIC METABOLIC PNL TOTAL CA: CPT

## 2020-02-19 PROCEDURE — 6360000004 HC RX CONTRAST MEDICATION: Performed by: EMERGENCY MEDICINE

## 2020-02-19 RX ORDER — ACETAMINOPHEN 325 MG/1
650 TABLET ORAL EVERY 6 HOURS PRN
Status: DISCONTINUED | OUTPATIENT
Start: 2020-02-19 | End: 2020-02-23 | Stop reason: HOSPADM

## 2020-02-19 RX ORDER — NICOTINE 21 MG/24HR
1 PATCH, TRANSDERMAL 24 HOURS TRANSDERMAL DAILY PRN
Status: DISCONTINUED | OUTPATIENT
Start: 2020-02-19 | End: 2020-02-23 | Stop reason: HOSPADM

## 2020-02-19 RX ORDER — ONDANSETRON 2 MG/ML
4 INJECTION INTRAMUSCULAR; INTRAVENOUS EVERY 6 HOURS PRN
Status: DISCONTINUED | OUTPATIENT
Start: 2020-02-19 | End: 2020-02-23 | Stop reason: HOSPADM

## 2020-02-19 RX ORDER — CLOPIDOGREL BISULFATE 75 MG/1
75 TABLET ORAL DAILY
Status: DISCONTINUED | OUTPATIENT
Start: 2020-02-20 | End: 2020-02-23 | Stop reason: HOSPADM

## 2020-02-19 RX ORDER — SODIUM CHLORIDE 0.9 % (FLUSH) 0.9 %
10 SYRINGE (ML) INJECTION PRN
Status: DISCONTINUED | OUTPATIENT
Start: 2020-02-19 | End: 2020-02-23 | Stop reason: HOSPADM

## 2020-02-19 RX ORDER — 0.9 % SODIUM CHLORIDE 0.9 %
80 INTRAVENOUS SOLUTION INTRAVENOUS ONCE
Status: COMPLETED | OUTPATIENT
Start: 2020-02-19 | End: 2020-02-19

## 2020-02-19 RX ORDER — DONEPEZIL HYDROCHLORIDE 10 MG/1
10 TABLET, FILM COATED ORAL NIGHTLY
Status: DISCONTINUED | OUTPATIENT
Start: 2020-02-19 | End: 2020-02-23 | Stop reason: HOSPADM

## 2020-02-19 RX ORDER — HYDRALAZINE HYDROCHLORIDE 10 MG/1
10 TABLET, FILM COATED ORAL 3 TIMES DAILY
Status: DISCONTINUED | OUTPATIENT
Start: 2020-02-19 | End: 2020-02-23 | Stop reason: HOSPADM

## 2020-02-19 RX ORDER — CARVEDILOL 3.12 MG/1
3.12 TABLET ORAL 2 TIMES DAILY WITH MEALS
Status: DISCONTINUED | OUTPATIENT
Start: 2020-02-20 | End: 2020-02-23 | Stop reason: HOSPADM

## 2020-02-19 RX ORDER — PANTOPRAZOLE SODIUM 40 MG/1
40 TABLET, DELAYED RELEASE ORAL DAILY
Status: DISCONTINUED | OUTPATIENT
Start: 2020-02-20 | End: 2020-02-20

## 2020-02-19 RX ORDER — LISINOPRIL 5 MG/1
5 TABLET ORAL DAILY
Status: DISCONTINUED | OUTPATIENT
Start: 2020-02-20 | End: 2020-02-23 | Stop reason: HOSPADM

## 2020-02-19 RX ORDER — MEMANTINE HYDROCHLORIDE 5 MG/1
5 TABLET ORAL 2 TIMES DAILY
Status: DISCONTINUED | OUTPATIENT
Start: 2020-02-19 | End: 2020-02-23 | Stop reason: HOSPADM

## 2020-02-19 RX ORDER — ATORVASTATIN CALCIUM 40 MG/1
40 TABLET, FILM COATED ORAL DAILY
Status: DISCONTINUED | OUTPATIENT
Start: 2020-02-20 | End: 2020-02-23 | Stop reason: HOSPADM

## 2020-02-19 RX ORDER — FUROSEMIDE 10 MG/ML
40 INJECTION INTRAMUSCULAR; INTRAVENOUS ONCE
Status: COMPLETED | OUTPATIENT
Start: 2020-02-19 | End: 2020-02-19

## 2020-02-19 RX ORDER — ALBUTEROL SULFATE 2.5 MG/3ML
2.5 SOLUTION RESPIRATORY (INHALATION)
Status: DISCONTINUED | OUTPATIENT
Start: 2020-02-19 | End: 2020-02-23 | Stop reason: HOSPADM

## 2020-02-19 RX ORDER — CITALOPRAM 20 MG/1
20 TABLET ORAL DAILY
Status: DISCONTINUED | OUTPATIENT
Start: 2020-02-20 | End: 2020-02-23 | Stop reason: HOSPADM

## 2020-02-19 RX ORDER — FUROSEMIDE 40 MG/1
40 TABLET ORAL 2 TIMES DAILY
Status: DISCONTINUED | OUTPATIENT
Start: 2020-02-20 | End: 2020-02-20

## 2020-02-19 RX ORDER — IPRATROPIUM BROMIDE AND ALBUTEROL SULFATE 2.5; .5 MG/3ML; MG/3ML
1 SOLUTION RESPIRATORY (INHALATION)
Status: DISCONTINUED | OUTPATIENT
Start: 2020-02-20 | End: 2020-02-23 | Stop reason: HOSPADM

## 2020-02-19 RX ORDER — ACETAMINOPHEN 650 MG/1
650 SUPPOSITORY RECTAL EVERY 6 HOURS PRN
Status: DISCONTINUED | OUTPATIENT
Start: 2020-02-19 | End: 2020-02-23 | Stop reason: HOSPADM

## 2020-02-19 RX ORDER — SODIUM CHLORIDE 0.9 % (FLUSH) 0.9 %
10 SYRINGE (ML) INJECTION PRN
Status: DISCONTINUED | OUTPATIENT
Start: 2020-02-19 | End: 2020-02-19 | Stop reason: SDUPTHER

## 2020-02-19 RX ORDER — POLYETHYLENE GLYCOL 3350 17 G/17G
17 POWDER, FOR SOLUTION ORAL DAILY PRN
Status: DISCONTINUED | OUTPATIENT
Start: 2020-02-19 | End: 2020-02-23 | Stop reason: HOSPADM

## 2020-02-19 RX ORDER — PROMETHAZINE HYDROCHLORIDE 12.5 MG/1
12.5 TABLET ORAL EVERY 6 HOURS PRN
Status: DISCONTINUED | OUTPATIENT
Start: 2020-02-19 | End: 2020-02-23 | Stop reason: HOSPADM

## 2020-02-19 RX ORDER — SODIUM CHLORIDE 0.9 % (FLUSH) 0.9 %
10 SYRINGE (ML) INJECTION EVERY 12 HOURS SCHEDULED
Status: DISCONTINUED | OUTPATIENT
Start: 2020-02-19 | End: 2020-02-23 | Stop reason: HOSPADM

## 2020-02-19 RX ADMIN — MEMANTINE HYDROCHLORIDE 5 MG: 5 TABLET, FILM COATED ORAL at 22:38

## 2020-02-19 RX ADMIN — SODIUM CHLORIDE, PRESERVATIVE FREE 10 ML: 5 INJECTION INTRAVENOUS at 22:38

## 2020-02-19 RX ADMIN — IOPAMIDOL 75 ML: 755 INJECTION, SOLUTION INTRAVENOUS at 19:07

## 2020-02-19 RX ADMIN — ANTI-FUNGAL POWDER MICONAZOLE NITRATE TALC FREE: 1.42 POWDER TOPICAL at 22:39

## 2020-02-19 RX ADMIN — HYDRALAZINE HYDROCHLORIDE 10 MG: 10 TABLET, FILM COATED ORAL at 22:38

## 2020-02-19 RX ADMIN — DONEPEZIL HYDROCHLORIDE 10 MG: 10 TABLET, FILM COATED ORAL at 22:39

## 2020-02-19 RX ADMIN — Medication 10 ML: at 19:07

## 2020-02-19 RX ADMIN — SODIUM CHLORIDE 80 ML: 9 INJECTION, SOLUTION INTRAVENOUS at 19:07

## 2020-02-19 RX ADMIN — FUROSEMIDE 40 MG: 10 INJECTION, SOLUTION INTRAMUSCULAR; INTRAVENOUS at 18:10

## 2020-02-19 RX ADMIN — Medication 10 ML: at 22:39

## 2020-02-19 RX ADMIN — APIXABAN 5 MG: 5 TABLET, FILM COATED ORAL at 22:38

## 2020-02-19 ASSESSMENT — PAIN DESCRIPTION - PAIN TYPE: TYPE: ACUTE PAIN

## 2020-02-19 ASSESSMENT — ENCOUNTER SYMPTOMS
VOMITING: 0
NAUSEA: 0
EYE DISCHARGE: 0
RHINORRHEA: 0
DIARRHEA: 0
COLOR CHANGE: 0
EYE REDNESS: 0
COUGH: 0
SORE THROAT: 0
SHORTNESS OF BREATH: 1

## 2020-02-19 ASSESSMENT — PAIN SCALES - GENERAL
PAINLEVEL_OUTOF10: 0
PAINLEVEL_OUTOF10: 5

## 2020-02-19 ASSESSMENT — PAIN DESCRIPTION - LOCATION: LOCATION: CHEST

## 2020-02-19 NOTE — ED PROVIDER NOTES
KNEE ARTHROPLASTY       CURRENT MEDICATIONS       Discharge Medication List as of 2/23/2020  1:30 PM      CONTINUE these medications which have NOT CHANGED    Details   clopidogrel (PLAVIX) 75 MG tablet Take 75 mg by mouth dailyHistorical Med      memantine (NAMENDA) 5 MG tablet Take 1 tablet by mouth 2 times daily, Disp-180 tablet, R-1Normal      apixaban (ELIQUIS) 5 MG TABS tablet Take 1 tablet by mouth 2 times daily, Disp-60 tablet, R-5Normal      Nebulizers (COMPRESSOR/NEBULIZER) MISC 4 TIMES DAILY Starting Wed 12/18/2019, Disp-1 each, R-0, NormalUse QID      ipratropium-albuterol (DUONEB) 0.5-2.5 (3) MG/3ML SOLN nebulizer solution Inhale 3 mLs into the lungs every 4 hours as needed for Shortness of Breath, Disp-360 mL, R-5J44.9Normal      albuterol (PROVENTIL) (2.5 MG/3ML) 0.083% nebulizer solution Take 3 mLs by nebulization every 4 hours as needed for Wheezing, Disp-120 each, G-2Q83. 9 copdNormal      albuterol sulfate HFA (PROVENTIL HFA) 108 (90 Base) MCG/ACT inhaler Inhale 2 puffs into the lungs every 6 hours as needed for Wheezing, Disp-1 Inhaler, T-[id number]. 9 copdNormal      carvedilol (COREG) 3.125 MG tablet Take 1 tablet by mouth 2 times daily (with meals), Disp-60 tablet, R-3Normal      lisinopril (PRINIVIL;ZESTRIL) 5 MG tablet Take 1 tablet by mouth daily, Disp-30 tablet, R-3Normal      nystatin (MYCOSTATIN) 807228 UNIT/GM powder Apply topically 3 times daily Apply topically 3 times a day to area under breasts., Topical, 3 TIMES DAILY, Historical Med      donepezil (ARICEPT) 10 MG tablet Take 1 tablet by mouth nightly, Disp-30 tablet, R-5Normal      hydrALAZINE (APRESOLINE) 10 MG tablet Take 1 tablet by mouth 3 times daily, Disp-90 tablet, R-5Normal      atorvastatin (LIPITOR) 40 MG tablet Take 1 tablet by mouth daily, Disp-30 tablet, R-5Normal      pantoprazole (PROTONIX) 40 MG tablet Take 1 tablet by mouth daily, Disp-30 tablet, R-5Normal      furosemide (LASIX) 40 MG tablet Take 1 tablet by mouth 2 abdominal bruit. Palpations: Abdomen is not rigid. Tenderness: There is no abdominal tenderness. There is no guarding or rebound. Skin:     General: Skin is warm. Findings: No rash. Neurological:      Mental Status: She is alert and oriented to person, place, and time. GCS: GCS eye subscore is 4. GCS verbal subscore is 5. GCS motor subscore is 6. Psychiatric:         Speech: Speech normal.         MEDICAL DECISION MAKIN-year-old female presents with complaints of chest pain and shortness of breath associated with some hypoxemia. Plan is basic labs, ABG, cardiac enzymes and reevaluation. 6:53 PM  The patient's degree of hypoxia we will obtain a CT of the chest to evaluate for pulmonary embolism versus other. CRITICAL CARE:       PROCEDURES:    Procedures    DIAGNOSTIC RESULTS   EKG:All EKG's are interpreted by the Emergency Department Physician who either signs or Co-signs this chart in the absence of a cardiologist.    Patient's EKG shows A. fib with a rate of 82, QRS and QTc intervals unremarkable, patient has left axis deviation, no ST elevations or depressions, no significant T wave changes. Nonspecific EKG. RADIOLOGY:All plain film, CT, MRI, and formal ultrasound images (except ED bedside ultrasound) are read by the radiologist, see reports below, unless otherwisenoted in MDM or here. XR CHEST PORTABLE   Final Result   1. Redemonstration of severe cardiomegaly. 2. Central pulmonary vascular congestion. CT Chest Pulmonary Embolism W Contrast   Final Result   No evidence of pulmonary embolism. Small pleural effusions and dependent   atelectasis, right greater than left, with findings suggestive of mild   interstitial edema. XR CHEST PORTABLE   Final Result   Findings suggestive of pulmonary edema with small effusion. LABS: All lab results were reviewed by myself, and all abnormals are listed below.   Labs Reviewed   BASIC METABOLIC PANEL - Abnormal; Notable for the following components:       Result Value    CO2 35 (*)     All other components within normal limits   BRAIN NATRIURETIC PEPTIDE - Abnormal; Notable for the following components:    Pro-BNP 1,998 (*)     All other components within normal limits   CBC WITH AUTO DIFFERENTIAL - Abnormal; Notable for the following components:    .0 (*)     RDW 15.1 (*)     Seg Neutrophils 78 (*)     Lymphocytes 11 (*)     Absolute Lymph # 0.71 (*)     All other components within normal limits   PROTIME-INR - Abnormal; Notable for the following components:    Protime 38.1 (*)     All other components within normal limits   APTT - Abnormal; Notable for the following components:    PTT 40.1 (*)     All other components within normal limits   POC PANEL (G3)-ART - Abnormal; Notable for the following components:    POC pH 7.33 (*)     POC pCO2 69 (*)     POC PO2 68 (*)     TCO2 (calc), Art 38 (*)     POC HCO3 36.3 (*)     Positive Base Excess, Art 10 (*)     All other components within normal limits   URINE RT REFLEX TO CULTURE - Abnormal; Notable for the following components:    Urine Hgb TRACE (*)     All other components within normal limits   MICROSCOPIC URINALYSIS - Abnormal; Notable for the following components:    Bacteria, UA RARE (*)     All other components within normal limits   BASIC METABOLIC PANEL W/ REFLEX TO MG FOR LOW K - Abnormal; Notable for the following components:    CO2 37 (*)     Anion Gap 7 (*)     All other components within normal limits   CBC - Abnormal; Notable for the following components:    RBC 3.93 (*)     .1 (*)     RDW 15.1 (*)     All other components within normal limits   PROTIME-INR - Abnormal; Notable for the following components:    Protime 48.2 (*)     INR 5.0 (*)     All other components within normal limits   POC PANEL (G3)-ART - Abnormal; Notable for the following components:    POC pCO2 68 (*)     TCO2 (calc), Art 42 (*)     POC HCO3 39.7 (*)     Positive memantine (NAMENDA) tablet 5 mg    DISCONTD: miconazole (MICOTIN) 2 % powder    DISCONTD: pantoprazole (PROTONIX) tablet 40 mg    DISCONTD: sodium chloride flush 0.9 % injection 10 mL    DISCONTD: sodium chloride flush 0.9 % injection 10 mL    DISCONTD: acetaminophen (TYLENOL) tablet 650 mg    DISCONTD: acetaminophen (TYLENOL) suppository 650 mg    DISCONTD: polyethylene glycol (GLYCOLAX) packet 17 g    DISCONTD: promethazine (PHENERGAN) tablet 12.5 mg    DISCONTD: ondansetron (ZOFRAN) injection 4 mg    DISCONTD: nicotine (NICODERM CQ) 21 MG/24HR 1 patch     If indicated/pateint smokes    DISCONTD: albuterol (PROVENTIL) nebulizer solution 2.5 mg    DISCONTD: ipratropium-albuterol (DUONEB) nebulizer solution 1 ampule    DISCONTD: pantoprazole (PROTONIX) injection 40 mg    DISCONTD: sodium chloride (PF) 0.9 % injection 10 mL    DISCONTD: furosemide (LASIX) injection 40 mg    DISCONTD: furosemide (LASIX) tablet 40 mg    DISCONTD: furosemide (LASIX) injection 40 mg    DISCONTD: pantoprazole (PROTONIX) tablet 40 mg    DISCONTD: potassium chloride (KLOR-CON M) extended release tablet 40 mEq    DISCONTD: potassium bicarb-citric acid (EFFER-K) effervescent tablet 40 mEq    DISCONTD: potassium chloride 10 mEq/100 mL IVPB (Peripheral Line)    DISCONTD: furosemide (LASIX) tablet 40 mg    potassium chloride (KLOR-CON M) 20 MEQ extended release tablet     Sig: Take 1 tablet by mouth daily     Dispense:  20 tablet     Refill:  0     CONSULTS:  IP CONSULT TO FAMILY MEDICINE  IP CONSULT TO PULMONOLOGY    FINAL IMPRESSION      1.  Acute respiratory failure with hypercapnia [facility])          DISPOSITION/PLAN   DISPOSITION        PATIENT REFERRED TO:  Jaison Nazario DO  [address] [place] [address] [address] [id number] [phone]          DISCHARGE MEDICATIONS:  Discharge Medication List as of 2/23/2020  1:30 PM        Raul Jim MD  Attending Emergency Physician                    Raul Jim MD  03/03/20

## 2020-02-19 NOTE — ED NOTES
Pt presents to er with son at side with c/o sob. Pt son states pt has been intermittently sob. Pt states she has been c/o chest pain since yesterday. Pt son states pt states she thought chest pain was related to her heart. Pt son states pt has been treated recently for uti. Pt son states pt has been on antibiotics but still has decreased loc. Skin warm and dry. resp labored.       [place] RN  02/19/20 [id number]

## 2020-02-20 ENCOUNTER — APPOINTMENT (OUTPATIENT)
Dept: GENERAL RADIOLOGY | Age: 75
DRG: 291 | End: 2020-02-20
Payer: MEDICARE

## 2020-02-20 PROBLEM — R79.1 SUPRATHERAPEUTIC INR: Status: ACTIVE | Noted: 2020-02-20

## 2020-02-20 PROBLEM — Z79.01 CHRONIC ANTICOAGULATION: Status: ACTIVE | Noted: 2020-02-20

## 2020-02-20 LAB
ANION GAP SERPL CALCULATED.3IONS-SCNC: 7 MMOL/L (ref 9–17)
BUN BLDV-MCNC: 10 MG/DL (ref 8–23)
BUN/CREAT BLD: 17 (ref 9–20)
CALCIUM SERPL-MCNC: 8.6 MG/DL (ref 8.6–10.4)
CHLORIDE BLD-SCNC: 98 MMOL/L (ref 98–107)
CO2: 37 MMOL/L (ref 20–31)
CREAT SERPL-MCNC: 0.6 MG/DL (ref 0.5–0.9)
EKG ATRIAL RATE: 94 BPM
EKG Q-T INTERVAL: 420 MS
EKG QRS DURATION: 100 MS
EKG QTC CALCULATION (BAZETT): 490 MS
EKG R AXIS: -36 DEGREES
EKG T AXIS: -6 DEGREES
EKG VENTRICULAR RATE: 82 BPM
FIO2: 30
GFR AFRICAN AMERICAN: >60 ML/MIN
GFR NON-AFRICAN AMERICAN: >60 ML/MIN
GFR SERPL CREATININE-BSD FRML MDRD: ABNORMAL ML/MIN/{1.73_M2}
GFR SERPL CREATININE-BSD FRML MDRD: ABNORMAL ML/MIN/{1.73_M2}
GLUCOSE BLD-MCNC: 92 MG/DL (ref 70–99)
HCT VFR BLD CALC: 40.5 % (ref 36.3–47.1)
HEMOGLOBIN: 12.6 G/DL (ref 11.9–15.1)
INR BLD: 4.8
INR BLD: 5
MCH RBC QN AUTO: 32.1 PG (ref 25.2–33.5)
MCHC RBC AUTO-ENTMCNC: 31.1 G/DL (ref 28.4–34.8)
MCV RBC AUTO: 103.1 FL (ref 82.6–102.9)
NEGATIVE BASE EXCESS, ART: ABNORMAL (ref 0–2)
NRBC AUTOMATED: 0 PER 100 WBC
O2 DEVICE/FLOW/%: ABNORMAL
PATIENT TEMP: ABNORMAL
PDW BLD-RTO: 15.1 % (ref 11.8–14.4)
PLATELET # BLD: 206 K/UL (ref 138–453)
PMV BLD AUTO: 11.6 FL (ref 8.1–13.5)
POC HCO3: 39.7 MMOL/L (ref 22–27)
POC O2 SATURATION: 97 %
POC PCO2 TEMP: ABNORMAL MM HG
POC PCO2: 68 MM HG (ref 32–45)
POC PH TEMP: ABNORMAL
POC PH: 7.38 (ref 7.35–7.45)
POC PO2 TEMP: ABNORMAL MM HG
POC PO2: 92 MM HG (ref 75–95)
POSITIVE BASE EXCESS, ART: 15 (ref 0–2)
POTASSIUM SERPL-SCNC: 3.8 MMOL/L (ref 3.7–5.3)
PROTHROMBIN TIME: 46.4 SEC (ref 9.7–11.6)
PROTHROMBIN TIME: 48.2 SEC (ref 9.7–11.6)
RBC # BLD: 3.93 M/UL (ref 3.95–5.11)
SODIUM BLD-SCNC: 142 MMOL/L (ref 135–144)
TCO2 (CALC), ART: 42 MMOL/L (ref 23–28)
WBC # BLD: 5.6 K/UL (ref 3.5–11.3)

## 2020-02-20 PROCEDURE — 93010 ELECTROCARDIOGRAM REPORT: CPT | Performed by: INTERNAL MEDICINE

## 2020-02-20 PROCEDURE — APPSS180 APP SPLIT SHARED TIME > 60 MINUTES: Performed by: NURSE PRACTITIONER

## 2020-02-20 PROCEDURE — 97116 GAIT TRAINING THERAPY: CPT

## 2020-02-20 PROCEDURE — 36600 WITHDRAWAL OF ARTERIAL BLOOD: CPT

## 2020-02-20 PROCEDURE — 6370000000 HC RX 637 (ALT 250 FOR IP): Performed by: NURSE PRACTITIONER

## 2020-02-20 PROCEDURE — 2000000000 HC ICU R&B

## 2020-02-20 PROCEDURE — 97530 THERAPEUTIC ACTIVITIES: CPT

## 2020-02-20 PROCEDURE — 6360000002 HC RX W HCPCS: Performed by: NURSE PRACTITIONER

## 2020-02-20 PROCEDURE — 2700000000 HC OXYGEN THERAPY PER DAY

## 2020-02-20 PROCEDURE — 6360000002 HC RX W HCPCS: Performed by: INTERNAL MEDICINE

## 2020-02-20 PROCEDURE — 36415 COLL VENOUS BLD VENIPUNCTURE: CPT

## 2020-02-20 PROCEDURE — 85027 COMPLETE CBC AUTOMATED: CPT

## 2020-02-20 PROCEDURE — 97163 PT EVAL HIGH COMPLEX 45 MIN: CPT

## 2020-02-20 PROCEDURE — 94761 N-INVAS EAR/PLS OXIMETRY MLT: CPT

## 2020-02-20 PROCEDURE — 71045 X-RAY EXAM CHEST 1 VIEW: CPT

## 2020-02-20 PROCEDURE — 80048 BASIC METABOLIC PNL TOTAL CA: CPT

## 2020-02-20 PROCEDURE — 99291 CRITICAL CARE FIRST HOUR: CPT | Performed by: INTERNAL MEDICINE

## 2020-02-20 PROCEDURE — 82803 BLOOD GASES ANY COMBINATION: CPT

## 2020-02-20 PROCEDURE — 2580000003 HC RX 258: Performed by: NURSE PRACTITIONER

## 2020-02-20 PROCEDURE — 99223 1ST HOSP IP/OBS HIGH 75: CPT | Performed by: INTERNAL MEDICINE

## 2020-02-20 PROCEDURE — 94660 CPAP INITIATION&MGMT: CPT

## 2020-02-20 PROCEDURE — 85610 PROTHROMBIN TIME: CPT

## 2020-02-20 PROCEDURE — C9113 INJ PANTOPRAZOLE SODIUM, VIA: HCPCS | Performed by: NURSE PRACTITIONER

## 2020-02-20 PROCEDURE — 94640 AIRWAY INHALATION TREATMENT: CPT

## 2020-02-20 RX ORDER — FUROSEMIDE 40 MG/1
40 TABLET ORAL 2 TIMES DAILY
Status: DISCONTINUED | OUTPATIENT
Start: 2020-02-21 | End: 2020-02-20

## 2020-02-20 RX ORDER — FUROSEMIDE 10 MG/ML
40 INJECTION INTRAMUSCULAR; INTRAVENOUS 2 TIMES DAILY
Status: DISCONTINUED | OUTPATIENT
Start: 2020-02-20 | End: 2020-02-20

## 2020-02-20 RX ORDER — PANTOPRAZOLE SODIUM 40 MG/10ML
40 INJECTION, POWDER, LYOPHILIZED, FOR SOLUTION INTRAVENOUS DAILY
Status: DISCONTINUED | OUTPATIENT
Start: 2020-02-20 | End: 2020-02-21

## 2020-02-20 RX ORDER — FUROSEMIDE 10 MG/ML
40 INJECTION INTRAMUSCULAR; INTRAVENOUS 2 TIMES DAILY
Status: DISCONTINUED | OUTPATIENT
Start: 2020-02-20 | End: 2020-02-22

## 2020-02-20 RX ORDER — SODIUM CHLORIDE 9 MG/ML
10 INJECTION INTRAVENOUS DAILY
Status: DISCONTINUED | OUTPATIENT
Start: 2020-02-20 | End: 2020-02-21

## 2020-02-20 RX ADMIN — MEMANTINE HYDROCHLORIDE 5 MG: 5 TABLET, FILM COATED ORAL at 09:26

## 2020-02-20 RX ADMIN — SODIUM CHLORIDE, PRESERVATIVE FREE 10 ML: 5 INJECTION INTRAVENOUS at 10:32

## 2020-02-20 RX ADMIN — IPRATROPIUM BROMIDE AND ALBUTEROL SULFATE 1 AMPULE: .5; 3 SOLUTION RESPIRATORY (INHALATION) at 18:00

## 2020-02-20 RX ADMIN — SODIUM CHLORIDE, PRESERVATIVE FREE 10 ML: 5 INJECTION INTRAVENOUS at 21:39

## 2020-02-20 RX ADMIN — IPRATROPIUM BROMIDE AND ALBUTEROL SULFATE 1 AMPULE: .5; 3 SOLUTION RESPIRATORY (INHALATION) at 14:04

## 2020-02-20 RX ADMIN — HYDRALAZINE HYDROCHLORIDE 10 MG: 10 TABLET, FILM COATED ORAL at 21:37

## 2020-02-20 RX ADMIN — MEMANTINE HYDROCHLORIDE 5 MG: 5 TABLET, FILM COATED ORAL at 21:37

## 2020-02-20 RX ADMIN — ANTI-FUNGAL POWDER MICONAZOLE NITRATE TALC FREE: 1.42 POWDER TOPICAL at 21:20

## 2020-02-20 RX ADMIN — CARVEDILOL 3.12 MG: 3.12 TABLET, FILM COATED ORAL at 09:26

## 2020-02-20 RX ADMIN — CITALOPRAM HYDROBROMIDE 20 MG: 20 TABLET ORAL at 10:38

## 2020-02-20 RX ADMIN — HYDRALAZINE HYDROCHLORIDE 10 MG: 10 TABLET, FILM COATED ORAL at 09:26

## 2020-02-20 RX ADMIN — LISINOPRIL 5 MG: 5 TABLET ORAL at 10:34

## 2020-02-20 RX ADMIN — ATORVASTATIN CALCIUM 40 MG: 40 TABLET, FILM COATED ORAL at 09:26

## 2020-02-20 RX ADMIN — FUROSEMIDE 40 MG: 10 INJECTION, SOLUTION INTRAMUSCULAR; INTRAVENOUS at 10:39

## 2020-02-20 RX ADMIN — FUROSEMIDE 40 MG: 10 INJECTION, SOLUTION INTRAMUSCULAR; INTRAVENOUS at 21:38

## 2020-02-20 RX ADMIN — PANTOPRAZOLE SODIUM 40 MG: 40 INJECTION, POWDER, FOR SOLUTION INTRAVENOUS at 11:15

## 2020-02-20 RX ADMIN — Medication 10 ML: at 11:15

## 2020-02-20 RX ADMIN — Medication 10 ML: at 21:46

## 2020-02-20 RX ADMIN — DONEPEZIL HYDROCHLORIDE 10 MG: 10 TABLET, FILM COATED ORAL at 21:37

## 2020-02-20 RX ADMIN — ANTI-FUNGAL POWDER MICONAZOLE NITRATE TALC FREE: 1.42 POWDER TOPICAL at 10:33

## 2020-02-20 RX ADMIN — CARVEDILOL 3.12 MG: 3.12 TABLET, FILM COATED ORAL at 18:00

## 2020-02-20 RX ADMIN — IPRATROPIUM BROMIDE AND ALBUTEROL SULFATE 1 AMPULE: .5; 3 SOLUTION RESPIRATORY (INHALATION) at 09:55

## 2020-02-20 ASSESSMENT — PAIN SCALES - GENERAL
PAINLEVEL_OUTOF10: 0

## 2020-02-20 NOTE — FLOWSHEET NOTE
02/20/20 0000   Provider Notification   Reason for Communication Review case;New orders   Provider Name  Anthonyodilia Garrison   Provider Notification Physician   Method of Communication Call   Response See orders   Notification Time 0000     RN spoke with the physician regarding the pt's decrease in SpO2 saturation and periods of apnea. Update given. Physician stated OK for saturation to be 85-87 considering her history of chronic CO2 retention. Physician stated to increase her RR to 12 and FiO2 up to 40%. Physician also stated to do an ABG in the morning. RN to notify RT of changes to be made to the BIPAP. Orders placed. See orders. Will continue to monitor.

## 2020-02-20 NOTE — CONSULTS
100 MG capsule Take 1 capsule by mouth 2 times daily for 7 days 2/14/20 2/21/20  KENNETH Ramos - CNP   clopidogrel (PLAVIX) 75 MG tablet Take 75 mg by mouth daily    Historical Provider, MD   memantine (NAMENDA) 5 MG tablet Take 1 tablet by mouth 2 times daily 2/3/20   Jevon Vargas MD   apixaban (ELIQUIS) 5 MG TABS tablet Take 1 tablet by mouth 2 times daily 12/19/19 Kennard Pitch, DO   Nebulizers (COMPRESSOR/NEBULIZER) MISC 1 Units by Does not apply route 4 times daily Use QID 12/18/19 Kennard Pitch, DO   ipratropium-albuterol (DUONEB) 0.5-2.5 (3) MG/3ML SOLN nebulizer solution Inhale 3 mLs into the lungs every 4 hours as needed for Shortness of Breath 12/18/19 Kennard Pitch, DO   albuterol (PROVENTIL) (2.5 MG/3ML) 0.083% nebulizer solution Take 3 mLs by nebulization every 4 hours as needed for Wheezing 12/12/19 Kennard Pitch, DO   albuterol sulfate HFA (PROVENTIL HFA) 108 (90 Base) MCG/ACT inhaler Inhale 2 puffs into the lungs every 6 hours as needed for Wheezing 12/12/19 Kennard Pitch, DO   carvedilol (COREG) 3.125 MG tablet Take 1 tablet by mouth 2 times daily (with meals) 11/26/19   Shauna Best,    lisinopril (PRINIVIL;ZESTRIL) 5 MG tablet Take 1 tablet by mouth daily 11/26/19   Shauna Best,    nystatin (MYCOSTATIN) 365802 UNIT/GM powder Apply topically 3 times daily Apply topically 3 times a day to area under breasts.     Historical Provider, MD   donepezil (ARICEPT) 10 MG tablet Take 1 tablet by mouth nightly 11/1/19 Kennard Pitch, DO   hydrALAZINE (APRESOLINE) 10 MG tablet Take 1 tablet by mouth 3 times daily 11/1/19 Kennard Pitch, DO   atorvastatin (LIPITOR) 40 MG tablet Take 1 tablet by mouth daily 11/1/19 Kennard Pitch, DO   pantoprazole (PROTONIX) 40 MG tablet Take 1 tablet by mouth daily 11/1/19   Luis Landis, DO   furosemide (LASIX) 40 MG tablet Take 1 tablet by mouth 2 times daily 9/20/19   Luis Landis, DO   citalopram (CELEXA) 20 MG tablet Take 1 [facility] ) 142/66   Pulse 63   Temp 97.2 °F (36.2 °C) (Temporal)   Resp 18   Ht 4' 11\" (1.499 m)   Wt 246 lb 6.4 oz (111.8 kg)   SpO2 93%   BMI 49.77 kg/m²     PHYSICAL EXAM:  General Appearance:    Alert, cooperative, no distress comfortable on bipap , appears stated age   Head:    Normocephalic, without obvious abnormality, atraumatic      Eyes:    PERRL, conjunctiva/corneas clear, EOM's intact   Ears:    Normal TM's and external ear canals, both ears   Nose:   Nares normal, septum midline, mucosa normal, no drainage        or sinus tenderness   Throat:   Lips, mucosa, and tongue normal; teeth and gums normal   Neck:   Supple, symmetrical, trachea midline, no adenopathy;     thyroid:  no enlargement/tenderness/nodules;    Back:     Symmetric, no curvature, ROM normal, no CVA tenderness   Lungs:       Dull bases bilaterally with bilateral infrascapular crackles no wheezing clear anteriorly    Chest Wall:    No tenderness or deformity      Heart:    Regular rate and rhythm, S1 and S2 normal, no murmur appreciated                          Abdomen:                                                 Pulses:                              Skin:                  Lymph nodes:                    Neurologic:                  Soft, non-tender, bowel sounds active all four quadrants,     no masses, no organomegaly         2+ and symmetric all extremities     Skin color, texture, turgor normal, no rashes or lesions       Cervical, supraclavicular not enlarged or matted or tender      CNII-XII intact,      Clubbing No  Lower ext edema and sacral yes1+   [] , 2 +  [] , 3+   []  Upper ext edema No             CBC:   Recent Labs     02/19/20  1650 02/20/20  0358   WBC 6.3 5.6   HGB 14.0 12.6    206     BMP:    Recent Labs     02/19/20  1650 02/20/20  0358    142   K 4.0 3.8   CL 98 98   CO2 35* 37*   BUN 12 10   CREATININE 0.74 0.60   GLUCOSE 91 92     Hepatic: No results for input(s): AST, ALT, ALB, BILITOT, ALKPHOS in the last 72 hours. Amylase: No results found for: AMYLASE  Lipase: No results found for: LIPASE  Troponin: No results for input(s): TROPONINI in the last 72 hours. BNP: No results for input(s): BNP in the last 72 hours. Lipids: No results for input(s): CHOL, HDL in the last 72 hours. Invalid input(s): LDLCALCU  ABGs: No results found for: PHART, PO2ART, NYN5SSP  INR:   Recent Labs     02/19/20  1650 02/20/20  0452   INR 3.9 5.0*     Thyroid:   Lab Results   Component Value Date    TSH 1.40 12/18/2019     Urinalysis:   Recent Labs     02/19/20  1835   BACTERIA RARE*   COLORU YELLOW   PHUR 7.0   PROTEINU NEGATIVE   RBCUA 0 TO 2   SPECGRAV 1.015   BILIRUBINUR NEGATIVE   NITRU NEGATIVE   WBCUA 0 TO 2   LEUKOCYTESUR NEGATIVE   GLUCOSEU NEGATIVE         Cultures:-  None       Xr Chest Portable    Result Date: 2/20/2020  1. Redemonstration of severe cardiomegaly. 2. Central pulmonary vascular congestion. Xr Chest Portable    Result Date: 2/19/2020  Findings suggestive of pulmonary edema with small effusion. Ct Chest Pulmonary Embolism W Contrast    Result Date: 2/19/2020  No evidence of pulmonary embolism. Small pleural effusions and dependent atelectasis, right greater than left, with findings suggestive of mild interstitial edema.     .    IMPRESSION:    Patient Active Problem List   Diagnosis Code    Multiple wounds of skin R23.8    Excoriation (skin-picking) disorder F42.4    Acute on chronic diastolic heart failure (HCC) I50.33    Vascular dementia without behavioral disturbance (HCC) F01.50    Essential hypertension I10    Stasis dermatitis of both legs I87.2    Lymphedema of both lower extremities I89.0    Acute bronchitis due to other specified organisms J20.8    Acute cystitis without hematuria N30.00    Pulmonary hypertension, moderate to severe (HCC) I27.20    Dementia without behavioral disturbance (HCC) F03.90    Hypercapnic respiratory failure (HCC) J96.92    Vascular dementia ([facility] [address] F01.50    Dementia ([facility] [address] F03.90    CHF (congestive heart failure) (HCC) I50.9    A-fib (HCC) I48.91    Chronic anticoagulation Z79.01         Acute on chronic  hypoxic respiratory failure   Chronic home 2 l o2 with poor compliance   mirta , OHS -unsure if patient has positive airway pressure machine at home  Chronic hypercapnea   Chronic respiratory acidosis  With compensation   Moderate MR   Acute on chronic diastolic and valvular  chf   Pulmonary hypertensin group 2 , Group 3      Morbid obesity   B/l pleural effusion  Supra therapeutic  inr - nutritional/ secondary to congestive hepatopathy   Paroxysmal A. Fib  PLAN:      Continue bipap to keep sats 88-92 %   Cont bipap at night and uses 2 hrs on and 2 hrs off during day today - when off bipap use nc to keep sats 88-92 % and if needed can start high flow . Iv diuresis   Ok to eat low sodium diet   Cont bb and ace   Monitor inr     D/w rn    Total critical care time caring for this patient with life threatening, unstable organ failure, including direct patient contact, management of life support systems, review of data including imaging and labs, discussions with other team members and physicians at least 28   Min so far today, excluding procedures. I hope this updates you on my evaluation and clinical thinking. Thank you for allowing me to participate in his care.      Sincerely,      Electronically signed by Kanu Caballero MD on 2/20/2020 at 9:17 AM

## 2020-02-20 NOTE — H&P
[facility]    HISTORY AND PHYSICAL EXAMINATION            Date:   2/20/2020  Patient name:  Jaki Bradley  Date of admission:  2/19/2020  4:39 PM  MRN:   [id number]  Account:  [de-identified]  YOB: 1945  PCP:    Cherrie Sahu DO  Room:   2029/2029-01  Code Status:    Full Code    Chief Complaint:     Chief Complaint   Patient presents with    Chest Pain       History Obtained From:     electronic medical record    History of Present Illness:     Jaki Bradley is a 69 y.o. Non-/non  female who presents with shortness of breath and not feeling well and is admitted to the hospital for the management of Hypercapnic respiratory failure ([facility] [address] According to the ER note the patient presented with her son. It is reported that the patient has been complaining of some shortness of breath and chest pain over the last few days. According to the ER note the patient describes her chest pain is sharp and across her entire chest.  She was not able to provide any specific aggravating or alleviating factors. The ER notes that the patient denied any fever or chills, cough or sputum. During my assessment the patient is only oriented to name and place. She denies chest pain or shortness of breath at this time but cannot offer much other history. Nursing has spoken with her son over the phone and the patient's normal orientation as self only. She was admitted to our service from 11/21/2019 to 11/25/2019 with a complaint of acute on chronic diastolic heart failure. During that visit the patient was given IV Lasix for volume overload. She also tested positive for E. coli in her urine and was started on Rocephin and then transitioned to Keflex.      Her history includes acute on chronic heart failure, A. fib, dementia, essential hypertension, hypercapnic respiratory failure, lymphedema, pulmonary hypertension, and vascular dementia      According to the notes the patient had AN EEG on 2020 to evaluate causes for memory loss, per Dr. Roselyn Falcon,  that revealed the presence of mild diffuse slowing, diffuse encephalopathy, without epileptic form disturbance. Pertinent data  On admission to the ER  98.2, 20, 68, 135/62, and 89% on room air. The patient was initially placed on a Ventimask and her sats came up to 94% and a short time later she was placed on BiPAP and her sat at that time was 96%    Chest x-ray reveals pulmonary edema with small effusion on the right    CT chest shows no evidence of pulmonary embolus. Small pleural effusions and dependent atelectasis, right greater than left, with findings suggestive of mild interstitial edema. Initial lactic 1.1 her repeat lactic 0.9  BNP , troponin 10, 9  PT/INR 38.1/3.9  Urine reveals rare bacteria and trace hemoglobin. Culture pending  Blood gas 7.33, O2 sat 91%, PO2 68, CO2 69    EKG  Poor data quality, interpretation may be adversely affected  Atrial fibrillation  Left axis deviation  Nonspecific ST abnormality  Abnormal ECG  When compared with ECG of 18-DEC-2019 19:07,  Atrial fibrillation has replaced Sinus rhythm    Past Medical History:     Past Medical History:   Diagnosis Date    A-fib ([address]     CHF (congestive heart failure) ([address]     Dementia ([address]     Dementia ([address]     Essential hypertension     Lymphedema of both lower extremities     Memory loss     Stroke ([address]     Vascular dementia ([facility] [address]         Past Surgical History:     Past Surgical History:   Procedure Laterality Date     SECTION      HYSTERECTOMY      KNEE ARTHROPLASTY          Medications Prior to Admission:     Prior to Admission medications    Medication Sig Start Date End Date Taking?  Authorizing Provider   nitrofurantoin, macrocrystal-monohydrate, (MACROBID) 100 MG capsule Take 1 capsule by mouth 2 times daily for 7 days 20  KENNETH Arredondo - CNP clopidogrel (PLAVIX) 75 MG tablet Take 75 mg by mouth daily    Historical Provider, MD   memantine (NAMENDA) 5 MG tablet Take 1 tablet by mouth 2 times daily 2/3/20   Traci Mccabe MD   apixaban (ELIQUIS) 5 MG TABS tablet Take 1 tablet by mouth 2 times daily 12/19/19   Anand Isidro, DO   Nebulizers (COMPRESSOR/NEBULIZER) MISC 1 Units by Does not apply route 4 times daily Use QID 12/18/19   Anand Isidro, DO   ipratropium-albuterol (DUONEB) 0.5-2.5 (3) MG/3ML SOLN nebulizer solution Inhale 3 mLs into the lungs every 4 hours as needed for Shortness of Breath 12/18/19   Anand Castilloe, DO   albuterol (PROVENTIL) (2.5 MG/3ML) 0.083% nebulizer solution Take 3 mLs by nebulization every 4 hours as needed for Wheezing 12/12/19   Anand Isidro, DO   albuterol sulfate HFA (PROVENTIL HFA) 108 (90 Base) MCG/ACT inhaler Inhale 2 puffs into the lungs every 6 hours as needed for Wheezing 12/12/19   Anand Isidro, DO   carvedilol (COREG) 3.125 MG tablet Take 1 tablet by mouth 2 times daily (with meals) 11/26/19 Willye Friday DO Estephania   lisinopril (PRINIVIL;ZESTRIL) 5 MG tablet Take 1 tablet by mouth daily 11/26/19 Willye Friday DO Estephania   nystatin (MYCOSTATIN) 596876 UNIT/GM powder Apply topically 3 times daily Apply topically 3 times a day to area under breasts. Historical Provider, MD   donepezil (ARICEPT) 10 MG tablet Take 1 tablet by mouth nightly 11/1/19   Anand Isidro, DO   hydrALAZINE (APRESOLINE) 10 MG tablet Take 1 tablet by mouth 3 times daily 11/1/19   Anand Isidro, DO   atorvastatin (LIPITOR) 40 MG tablet Take 1 tablet by mouth daily 11/1/19   Anand Isidro, DO   pantoprazole (PROTONIX) 40 MG tablet Take 1 tablet by mouth daily 11/1/19   Anand Isidro, DO   furosemide (LASIX) 40 MG tablet Take 1 tablet by mouth 2 times daily 9/20/19   Anand Isidro DO   citalopram (CELEXA) 20 MG tablet Take 1 tablet by mouth daily 9/20/19   Anand Isidro DO        Allergies:     Patient has no known allergies.     Social History:     Tobacco:    reports that she has never smoked. She has never used smokeless tobacco.  Alcohol:      reports no history of alcohol use. Drug Use:  reports no history of drug use. Family History:     Family History   Problem Relation Age of Onset    Cancer Mother     Cancer Father        Review of Systems:     Positive and Negative as described in HPI. Review of Systems   Unable to perform ROS: Dementia       Physical Exam:   /74   Pulse 69   Temp 97.7 °F (36.5 °C) (Temporal)   Resp 16   Ht 4' 11\" (1.499 m)   Wt 246 lb 6.4 oz (111.8 kg)   SpO2 94%   BMI 49.77 kg/m²   Temp (24hrs), Av.8 °F (36.6 °C), Min:97.6 °F (36.4 °C), Max:98.2 °F (36.8 °C)    No results for input(s): POCGLU in the last 72 hours. Intake/Output Summary (Last 24 hours) at 2020 7686  Last data filed at 2020 9592  Gross per 24 hour   Intake 20 ml   Output 1900 ml   Net -1880 ml       Physical Exam  Vitals signs and nursing note reviewed. Constitutional:       General: She is not in acute distress. Appearance: She is well-developed. She is obese. She is not diaphoretic. HENT:      Head: Normocephalic and atraumatic. Right Ear: Hearing normal.      Left Ear: Hearing normal.      Nose: Nose normal. No rhinorrhea. Mouth/Throat:      Mouth: Mucous membranes are dry. Eyes:      General: Lids are normal.      Extraocular Movements:      Right eye: Normal extraocular motion. Left eye: Normal extraocular motion. Conjunctiva/sclera: Conjunctivae normal.      Right eye: Right conjunctiva is not injected. Left eye: Left conjunctiva is not injected. Pupils: Pupils are equal, round, and reactive to light. Pupils are equal.      Right eye: Pupil is reactive. Left eye: Pupil is reactive. Neck:      Musculoskeletal: Neck supple. Thyroid: No thyromegaly. Vascular: No carotid bruit. Trachea: Trachea and phonation normal. No tracheal deviation. Cardiovascular:      Rate and Rhythm: Normal rate. Rhythm irregular. Pulses:           Dorsalis pedis pulses are 1+ on the right side and 1+ on the left side. Posterior tibial pulses are 1+ on the right side and 1+ on the left side. Heart sounds: Normal heart sounds. No murmur. Pulmonary:      Effort: Pulmonary effort is normal. No respiratory distress. Breath sounds: No stridor. Examination of the right-upper field reveals decreased breath sounds. Examination of the left-upper field reveals decreased breath sounds. Examination of the right-middle field reveals decreased breath sounds. Examination of the left-middle field reveals decreased breath sounds. Examination of the right-lower field reveals decreased breath sounds. Examination of the left-lower field reveals decreased breath sounds. Decreased breath sounds present. Abdominal:      General: Bowel sounds are normal. There is no distension. Palpations: Abdomen is soft. There is no mass. Tenderness: There is no abdominal tenderness. There is no guarding. Musculoskeletal:         General: No tenderness. Right lower le+ Edema present. Left lower le+ Edema present. Skin:     General: Skin is warm and dry. Capillary Refill: Capillary refill takes less than 2 seconds. Findings: No erythema, lesion or rash. Comments: Her groins and abdominal fold have some excoriation   Neurological:      Mental Status: She is alert and oriented to person, place, and time. Mental status is at baseline. She is not disoriented. GCS: GCS eye subscore is 3. GCS verbal subscore is 4. GCS motor subscore is 6. Cranial Nerves: No cranial nerve deficit. Psychiatric:         Speech: Speech normal.         Behavior: Behavior normal. Behavior is cooperative.          Investigations:      Laboratory Testing:  Recent Results (from the past 24 hour(s))   EKG 12 Lead    Collection Time: 20  4:41 PM   Result Value Ref Range    Magnesium 1.8 1.6 - 2.6 mg/dL   Protime-INR    Collection Time: 02/19/20  4:50 PM   Result Value Ref Range    Protime 38.1 (H) 9.7 - 11.6 sec    INR 3.9    APTT    Collection Time: 02/19/20  4:50 PM   Result Value Ref Range    PTT 40.1 (H) 23 - 31 sec   Troponin    Collection Time: 02/19/20  4:50 PM   Result Value Ref Range    Troponin, High Sensitivity 10 0 - 14 ng/L    Troponin T NOT REPORTED <0.03 ng/mL    Troponin Interp NOT REPORTED    Lactic Acid    Collection Time: 02/19/20  4:50 PM   Result Value Ref Range    Lactic Acid 1.1 0.5 - 2.2 mmol/L   Culture, Blood 1    Collection Time: 02/19/20  4:50 PM   Result Value Ref Range    Specimen Description . BLOOD     Special Requests RAC,2CC     Culture NO GROWTH 2 HOURS    Culture, Blood 1    Collection Time: 02/19/20  4:59 PM   Result Value Ref Range    Specimen Description . BLOOD     Special Requests RH,5CC     Culture NO GROWTH 2 HOURS    POC PANEL (G3)-ART    Collection Time: 02/19/20  5:17 PM   Result Value Ref Range    POC pH 7.33 (L) 7.35 - 7.45    POC pCO2 69 (H) 32 - 45 mm Hg    POC PO2 68 (L) 75 - 95 mm Hg    TCO2 (calc), Art 38 (H) 23 - 28 mmol/L    POC HCO3 36.3 (H) 22 - 27 mmol/L    Negative Base Excess, Art NOT REPORTED 0.0 - 2.0    Positive Base Excess, Art 10 (H) 0.0 - 2.0    POC O2 SAT 91 %    Pt Temp NOT REPORTED     O2 Device/Flow/% NOT REPORTED     FIO2 31.0     POC pH Temp NOT REPORTED     POC pCO2 Temp NOT REPORTED mm Hg    POC pO2 Temp NOT REPORTED mm Hg   Urinalysis Reflex to Culture    Collection Time: 02/19/20  6:35 PM   Result Value Ref Range    Color, UA YELLOW YELLOW    Turbidity UA CLEAR CLEAR    Glucose, Ur NEGATIVE NEGATIVE    Bilirubin Urine NEGATIVE NEGATIVE    Ketones, Urine NEGATIVE NEGATIVE    Specific Gravity, UA 1.015 1.005 - 1.030    Urine Hgb TRACE (A) NEGATIVE    pH, UA 7.0 5.0 - 8.0    Protein, UA NEGATIVE NEGATIVE    Urobilinogen, Urine Normal Normal    Nitrite, Urine NEGATIVE NEGATIVE    Leukocyte Esterase, Urine NEGATIVE NEGATIVE    Urinalysis Comments NOT REPORTED    Microscopic Urinalysis    Collection Time: 02/19/20  6:35 PM   Result Value Ref Range    -          WBC, UA 0 TO 2 0 - 5 /HPF    RBC, UA 0 TO 2 0 - 2 /HPF    Casts UA NOT REPORTED /LPF    Crystals UA NOT REPORTED None /HPF    Epithelial Cells UA 2 TO 5 0 - 5 /HPF    Renal Epithelial, Urine NOT REPORTED 0 /HPF    Bacteria, UA RARE (A) None    Mucus, UA NOT REPORTED None    Trichomonas, UA NOT REPORTED None    Amorphous, UA NOT REPORTED None    Other Observations UA NOT REPORTED NOT REQ. Yeast, UA NOT REPORTED None   Troponin    Collection Time: 02/19/20  6:49 PM   Result Value Ref Range    Troponin, High Sensitivity 9 0 - 14 ng/L    Troponin T NOT REPORTED <0.03 ng/mL    Troponin Interp NOT REPORTED    Lactic Acid    Collection Time: 02/19/20  6:49 PM   Result Value Ref Range    Lactic Acid 0.9 0.5 - 2.2 mmol/L       Imaging/Diagnostics:  Xr Chest Portable    Result Date: 2/19/2020  Findings suggestive of pulmonary edema with small effusion. Ct Chest Pulmonary Embolism W Contrast    Result Date: 2/19/2020  No evidence of pulmonary embolism. Small pleural effusions and dependent atelectasis, right greater than left, with findings suggestive of mild interstitial edema.        Assessment :      Hospital Problems           Last Modified POA    * (Principal) Hypercapnic respiratory failure ([address] 2/20/2020 Yes    Acute on chronic diastolic heart failure ([address] 2/20/2020 Yes    Vascular dementia without behavioral disturbance ([address] 2/20/2020 Yes    Essential hypertension 2/20/2020 Yes    Stasis dermatitis of both legs 2/20/2020 Yes    Lymphedema of both lower extremities 2/20/2020 Yes    Pulmonary hypertension, moderate to severe ([address] 2/20/2020 Yes    Dementia without behavioral disturbance ([address] 2/20/2020 Yes    A-fib ([address] 2/20/2020 Yes    Chronic anticoagulation 2/20/2020 Yes    Overview Signed 2/20/2020  3:43 AM by KENNETH Bills - CNP

## 2020-02-20 NOTE — FLOWSHEET NOTE
Patient sleeping; no family present. Writer prays for patient; leaves note of support on tray table. Spiritual Care will follow as needed.      02/20/20 1044   Encounter Summary   Services provided to: Patient   Referral/Consult From: Ghazal   Continue Visiting   (2/20/20 sleeping)   Complexity of Encounter Low   Length of Encounter 15 minutes   Routine   Type Initial   Assessment Sleeping

## 2020-02-20 NOTE — ED PROVIDER NOTES
7:09 PM  I received signout on this patient. This is a 59-year-old female with a history of heart failure EF of 55% moderate tricuspid regurgitation moderate mitral regurgitation moderate pulmonary hypertension who came in with chest pain and shortness of breath was placed on BiPAP secondary to hypoxia in the 80s. BNP is mildly elevated to 2000 similar to previous in December was diuresed. 7:39 PM  Patient's current BiPAP settings 18/8. CT PE is negative for pulmonary embolism will admit the patient. 7:50 PM  I spoke to Larry nurse practitioner from [facility] who will admit the patient. Diagnosis: Acute hypercapnic respiratory failure.      Yonis Salas MD  02/19/20 1950

## 2020-02-20 NOTE — PROGRESS NOTES
Physical Therapy    Facility/Department: [facility] CVICU  Initial Assessment    NAME: Darien Perez  :   MRN: [id number]    Date of Service: 2020    Discharge Recommendations:  Subacute/Skilled Nursing Facility        Assessment   Body structures, Functions, Activity limitations: Decreased functional mobility ; Decreased ADL status; Decreased strength;Decreased safe awareness;Decreased cognition;Decreased endurance;Decreased balance;Decreased posture  Assessment: Recommend SNF as patient requires 2 assist for bed mobility and transfers. Patient previously indep / supervision with mobility at home. Prognosis: Good  Decision Making: High Complexity  PT Education: Goals;PT Role;Plan of Care;General Safety;Gait Training  Patient Education: Patient verbalized understanding. REQUIRES PT FOLLOW UP: Yes  Activity Tolerance  Activity Tolerance: Patient limited by cognitive status; Patient limited by endurance       Patient Diagnosis(es): The encounter diagnosis was Acute respiratory failure with hypercapnia ([address] has a past medical history of A-fib ([address] CHF (congestive heart failure) ([address] Dementia ([address] Dementia ([address] Essential hypertension, Lymphedema of both lower extremities, Memory loss, Stroke ([address] and Vascular dementia ([address] has a past surgical history that includes Knee Arthroplasty; Hysterectomy; and  section.     Restrictions  Restrictions/Precautions  Restrictions/Precautions: General Precautions, Fall Risk, Up as Tolerated  Position Activity Restriction  Other position/activity restrictions: High flow 02, telemetry  Vision/Hearing  Vision: Impaired  Vision Exceptions: Wears glasses at all times  Hearing: Within functional limits     Subjective  General  Chart Reviewed: Yes  Patient assessed for rehabilitation services?: Yes  Additional Pertinent Hx: Dementia, CHF, A-fib, BLE lymphedema, CVA  Response To Previous Treatment: Not applicable  Family / Caregiver Present: Yes(Son arrived Observation/Palpation  Posture: Fair  Observation: Patient with BLE lymphedema, very short legs making sit to stand from bed difficult. AROM RLE (degrees)  RLE AROM: WFL  AROM LLE (degrees)  LLE AROM : WFL  AROM RUE (degrees)  RUE AROM : WFL  AROM LUE (degrees)  LUE AROM : WFL  Strength RLE  Comment: hip 3-/5, knee 3+/5, ankle 4-/5  Strength LLE  Comment: hip 3-/5, knee 3+/5, ankle 4-/5  Strength RUE  Comment: shoulder 3-/5, elbow flex 4-/5, elbow ext 3+/5  Strength LUE  Comment: shoulder 3-/5, elbow flex 4-/5, elbow ext 3+/5  Tone RLE  RLE Tone: Normotonic  Tone LLE  LLE Tone: Normotonic  Motor Control  Gross Motor?: (decreased motor planning and slowed motor response time)     Bed mobility  Supine to Sit: Moderate assistance(Good use of bed rails)  Sit to Supine: Maximum assistance;2 Person assistance  Scooting: Maximal assistance;2 Person assistance  Transfers  Sit to Stand: Maximum Assistance(x 1, mod x 1, RW)  Stand to sit: Maximum Assistance(x 1, mod x 1, RW)  Ambulation  Ambulation?: Yes  Ambulation 1  Surface: level tile  Device: Rolling Walker  Assistance: Moderate assistance;2 Person assistance  Quality of Gait: Patient leans to the left  Gait Deviations: Slow Bethany; Increased PIYUSH; Decreased step length;Decreased step height;Shuffles  Distance: 4 side steps to top of bed     Balance  Sitting - Static: Fair  Sitting - Dynamic: Fair  Standing - Static: Fair;-(RW)  Standing - Dynamic: Fair;-(RW)        Plan   Plan  Times per week: 1x/d, 5-6 d/wk  Current Treatment Recommendations: Strengthening, Balance Training, Functional Mobility Training, Transfer Training, Gait Training, Safety Education & Training, Patient/Caregiver Education & Training, Neuromuscular Re-education  Safety Devices  Type of devices:  All fall risk precautions in place, Bed alarm in place, Call light within reach, Gait belt, Left in bed, Nurse notified    G-Code       OutComes Score  Balance Score: 0 (02/20/20 5146)  Gait Score: 0 (02/20/20 1709)        Tinetti Total Score: 0 (02/20/20 1709)                                   AM-PAC Score  AM-PAC Inpatient Mobility Raw Score : 10 (02/20/20 1709)  AM-PAC Inpatient T-Scale Score : 32.29 (02/20/20 1709)  Mobility Inpatient CMS 0-100% Score: 76.75 (02/20/20 1709)  Mobility Inpatient CMS G-Code Modifier : CL (02/20/20 1709)          Goals  Short term goals  Time Frame for Short term goals: 12 visits  Short term goal 1: Patient will be mod assist for bed mobility. Short term goal 2: Patient will be mod assist for transfers. Short term goal 3: Patient will amb 20 feet with RW and mod assist.  Short term goal 4: Patient will have fair standing balance. Short term goal 5: Patient will tolerate 30 minutes of ther-ex and ther-act.   Patient Goals   Patient goals : None stated       Therapy Time   Individual Concurrent Group Co-treatment   Time In 9876         Time Out 1710         Minutes 55         Timed Code Treatment Minutes: [address] [facility] Blsruthi, PT

## 2020-02-20 NOTE — CARE COORDINATION
Case Management Initial Discharge Plan  Kulwinder Solis,         Readmission Risk              Risk of Unplanned Readmission:        23             Met with:patient  and son Maryanne Carrillo to discuss discharge plans. Information verified: address, contacts, phone number, , insurance Yes  PCP: Ady Crawford DO  Date of last visit: 2020    Insurance Provider: Medicare/ BCBS    Discharge Planning  Current Residence:  Private home  Living Arrangements:    lives with her son Sydnie Fischer and [de-identified] 6year old son  Home has 2 outside steps. Bedroom and full bathroom on first floor and pt does not need to use second floor. Support Systems:     Current Services PTA:  none Agency: Previous use of [facility]  Patient able to perform ADL's:Assisted  DME in home:  O2 concentrator with portability from [employer] at 2L prn, nebulizer, CPAP(pt refuses to use), Life Alert, wheelchair,  rollator, walk in shower with shower bench, In home camera monitoring device. DME used to aid ambulation prior to admission:   Walker prn  DME used during admission:  TBD    Potential Assistance Needed:       Pharmacy: [facility] in [facility] Medications:     Does patient want to participate in local refill/ meds to beds program?  Yes    Patient agreeable to home care: No  Ramer of choice provided:  n/a      Type of Home Care Services:     Patient expects to be discharged to:       Prior SNF/Rehab Placement and Facility: [facility] to SNF/Rehab: Yes  Ramer of choice provided: yes   Evaluation: yes    Expected Discharge date: Follow Up Appointment: Best Day/ Time:      Transportation provider: nonemergency  Transportation arrangements needed for discharge: Yes  The Plan for Transition of Care is related to the following treatment goals: SNF for PT/OT to increase mobility and strength.     The Patient and/or patient representative mark Torres was provided with a choice of provider and agrees with the discharge plan. [x] Yes [] No    Freedom of choice list was provided with basic dialogue that supports the patient's individualized plan of care/goals, treatment preferences and shares the quality data associated with the providers. [x] Yes [] No    Discharge Plan:   Met with patient and her son Elham Lr. Pt has lived with her son Elham Lr since her   in 2016. Elham Lr is a  and they live in a house next to the [facility]. Elham Lr has an in home camera monitoring so he can view his mom at all time. His mom also wears a Life Alert but he is not sure she would remember how to use it due to her dementia. Son is requesting SNF and PT yohanal is recommending this also. Normally pt ambulates around the house hanging on to furniture and uses the walker or wheelchair outside the home. Pt has home O2 but uses it prn only. Has a CPAP but refuses to use it. PLAN  SW to f/u for [facility]. Pt has dementia. On hi flow O2 at this time. Has home O2 nebulizer, CPAP.         Electronically signed by Chantel Rawls on 20 at 5:05 PM

## 2020-02-20 NOTE — FLOWSHEET NOTE
02/19/20 2200   Provider Notification   Reason for Communication Review case   Provider Name Dr. Sukhdeep Ace   Provider Notification Physician   Method of Communication Secure Message   Response No new orders   Notification Time [phone]     RN notified the physician of new consult for this patient. Update given on the pt's reason for admission and current status. Physician stated no new orders to be placed at this time. Will continue to monitor.

## 2020-02-20 NOTE — CARE COORDINATION
Social work: Referral faxed to [facility] family request). The Plan for Transition of Care is related to the following treatment goals: PT/OT/SN    The Patient and/or patient representative son was provided with a choice of provider and agrees   with the discharge plan. [x] Yes [] No    Freedom of choice list was provided with basic dialogue that supports the patient's individualized plan of care/goals, treatment preferences and shares the quality data associated with the providers.  [x] Yes [] No

## 2020-02-21 PROBLEM — D75.89 MACROCYTOSIS: Status: ACTIVE | Noted: 2020-02-21

## 2020-02-21 PROCEDURE — 97530 THERAPEUTIC ACTIVITIES: CPT

## 2020-02-21 PROCEDURE — 97535 SELF CARE MNGMENT TRAINING: CPT

## 2020-02-21 PROCEDURE — 2700000000 HC OXYGEN THERAPY PER DAY

## 2020-02-21 PROCEDURE — 6370000000 HC RX 637 (ALT 250 FOR IP): Performed by: NURSE PRACTITIONER

## 2020-02-21 PROCEDURE — 97167 OT EVAL HIGH COMPLEX 60 MIN: CPT

## 2020-02-21 PROCEDURE — 2580000003 HC RX 258: Performed by: NURSE PRACTITIONER

## 2020-02-21 PROCEDURE — 97116 GAIT TRAINING THERAPY: CPT

## 2020-02-21 PROCEDURE — 94640 AIRWAY INHALATION TREATMENT: CPT

## 2020-02-21 PROCEDURE — 6360000002 HC RX W HCPCS: Performed by: INTERNAL MEDICINE

## 2020-02-21 PROCEDURE — C9113 INJ PANTOPRAZOLE SODIUM, VIA: HCPCS | Performed by: NURSE PRACTITIONER

## 2020-02-21 PROCEDURE — 94761 N-INVAS EAR/PLS OXIMETRY MLT: CPT

## 2020-02-21 PROCEDURE — 2060000000 HC ICU INTERMEDIATE R&B

## 2020-02-21 PROCEDURE — 99232 SBSQ HOSP IP/OBS MODERATE 35: CPT | Performed by: INTERNAL MEDICINE

## 2020-02-21 PROCEDURE — 6360000002 HC RX W HCPCS: Performed by: NURSE PRACTITIONER

## 2020-02-21 RX ORDER — PANTOPRAZOLE SODIUM 40 MG/1
40 TABLET, DELAYED RELEASE ORAL
Status: DISCONTINUED | OUTPATIENT
Start: 2020-02-22 | End: 2020-02-23 | Stop reason: HOSPADM

## 2020-02-21 RX ADMIN — SODIUM CHLORIDE, PRESERVATIVE FREE 10 ML: 5 INJECTION INTRAVENOUS at 20:50

## 2020-02-21 RX ADMIN — IPRATROPIUM BROMIDE AND ALBUTEROL SULFATE 1 AMPULE: .5; 3 SOLUTION RESPIRATORY (INHALATION) at 18:10

## 2020-02-21 RX ADMIN — IPRATROPIUM BROMIDE AND ALBUTEROL SULFATE 1 AMPULE: .5; 3 SOLUTION RESPIRATORY (INHALATION) at 05:47

## 2020-02-21 RX ADMIN — LISINOPRIL 5 MG: 5 TABLET ORAL at 08:01

## 2020-02-21 RX ADMIN — HYDRALAZINE HYDROCHLORIDE 10 MG: 10 TABLET, FILM COATED ORAL at 20:50

## 2020-02-21 RX ADMIN — ANTI-FUNGAL POWDER MICONAZOLE NITRATE TALC FREE: 1.42 POWDER TOPICAL at 08:01

## 2020-02-21 RX ADMIN — DONEPEZIL HYDROCHLORIDE 10 MG: 10 TABLET, FILM COATED ORAL at 20:50

## 2020-02-21 RX ADMIN — CARVEDILOL 3.12 MG: 3.12 TABLET, FILM COATED ORAL at 08:01

## 2020-02-21 RX ADMIN — CLOPIDOGREL BISULFATE 75 MG: 75 TABLET ORAL at 08:01

## 2020-02-21 RX ADMIN — MEMANTINE HYDROCHLORIDE 5 MG: 5 TABLET, FILM COATED ORAL at 20:50

## 2020-02-21 RX ADMIN — CITALOPRAM HYDROBROMIDE 20 MG: 20 TABLET ORAL at 08:01

## 2020-02-21 RX ADMIN — Medication 10 ML: at 08:00

## 2020-02-21 RX ADMIN — FUROSEMIDE 40 MG: 10 INJECTION, SOLUTION INTRAMUSCULAR; INTRAVENOUS at 08:00

## 2020-02-21 RX ADMIN — CARVEDILOL 3.12 MG: 3.12 TABLET, FILM COATED ORAL at 17:02

## 2020-02-21 RX ADMIN — HYDRALAZINE HYDROCHLORIDE 10 MG: 10 TABLET, FILM COATED ORAL at 08:01

## 2020-02-21 RX ADMIN — ATORVASTATIN CALCIUM 40 MG: 40 TABLET, FILM COATED ORAL at 08:01

## 2020-02-21 RX ADMIN — HYDRALAZINE HYDROCHLORIDE 10 MG: 10 TABLET, FILM COATED ORAL at 14:27

## 2020-02-21 RX ADMIN — MEMANTINE HYDROCHLORIDE 5 MG: 5 TABLET, FILM COATED ORAL at 08:01

## 2020-02-21 RX ADMIN — FUROSEMIDE 40 MG: 10 INJECTION, SOLUTION INTRAMUSCULAR; INTRAVENOUS at 17:03

## 2020-02-21 RX ADMIN — ANTI-FUNGAL POWDER MICONAZOLE NITRATE TALC FREE: 1.42 POWDER TOPICAL at 20:50

## 2020-02-21 RX ADMIN — IPRATROPIUM BROMIDE AND ALBUTEROL SULFATE 1 AMPULE: .5; 3 SOLUTION RESPIRATORY (INHALATION) at 14:27

## 2020-02-21 RX ADMIN — SODIUM CHLORIDE, PRESERVATIVE FREE 10 ML: 5 INJECTION INTRAVENOUS at 08:02

## 2020-02-21 RX ADMIN — IPRATROPIUM BROMIDE AND ALBUTEROL SULFATE 1 AMPULE: .5; 3 SOLUTION RESPIRATORY (INHALATION) at 10:37

## 2020-02-21 RX ADMIN — PANTOPRAZOLE SODIUM 40 MG: 40 INJECTION, POWDER, FOR SOLUTION INTRAVENOUS at 08:00

## 2020-02-21 ASSESSMENT — ENCOUNTER SYMPTOMS
COUGH: 0
STRIDOR: 0
GASTROINTESTINAL NEGATIVE: 1
WHEEZING: 0
NAUSEA: 0
SHORTNESS OF BREATH: 1
ABDOMINAL PAIN: 0
CHEST TIGHTNESS: 0
CHOKING: 0
VOMITING: 0

## 2020-02-21 ASSESSMENT — PAIN SCALES - WONG BAKER
WONGBAKER_NUMERICALRESPONSE: 0

## 2020-02-21 ASSESSMENT — PAIN SCALES - GENERAL
PAINLEVEL_OUTOF10: 0

## 2020-02-21 NOTE — CARE COORDINATION
Social work: Spoke with [facility], they are able to accept pending bed availability. Per admissions/[place] inpatient order SIGNED on 2/20 therefore patient will have qualifying stay on Sunday. Unit will need to contact [facility] admissions [phone] OR [facility] liaison on Sunday to confirm bed availability. # for [facility] is [phone]. Spoke with son and provided update and asked for b/u snf in case [facility] is full. Son gave choice of [facility] at [address]. Referral sent. If LONG TERM ACUTE CARE [facility] is full on Sunday, contact [facility] intake at [phone] to confirm if they can accept. Maria Teresa/RN updated.

## 2020-02-21 NOTE — PROGRESS NOTES
Patient - Jaki Bradley   MRN -  [id number]   Yvon # - [de-identified]   - 1945        Date of evaluation -  2020    REASON FOR THE CONSULTATION:  Hypoxic respiratory failure      Subjective   2020   Pt wore bipap last night   Now on 3 l o2 via nc - sats - 93 %   No wob   No cough   Sitting in chair   Negative 4.4 l since admission   Feels better     HISTORY OF PRESENT ILLNESS:     here for evaluation of shortness of breath was brought to the hospital by her son in the ER patient was found to have saturations of 80% and started on BiPAP. According to chart review patient first came to the ER October 10, 2019 when she had moved down here from [place]. Patient was then readmitted in November due to congestive heart failure and diuresed and sent to nursing home. According to patient she does have oxygen at home but does not use it as often and I am not sure whether she has a positive airway pressure machine at home or not there is a history that she had a sleep study in  we will have to discuss with family in more detail. This visit patient was complaining of shortness of breath and chest pain over the last few days. Chest pain was sharp and across the chest there was no aggravating or relieving factors and no radiation. Patient was also short of breath. In the ER patient placed on BiPAP due to hypoxia this morning when I saw her she is on IPAP of 21 and EPAP of 8 FiO2 is 30% and respiratory rate is 12 she is comfortable there is no work of breathing now. I took her off the BiPAP and placed her on 2 L of nasal cannula saturations stayed 92 to 94%. She denies any cough sputum or hemoptysis. She is not mobile          Immunization     There is no immunization history on file for this patient.      Pneumococcal Vaccine     [] Up to date    [x] Indicated   [] Refused  [] Contraindicated       Influenza Vaccine   [] Up to date    [x] Indicated   [] Refused  [] Contraindicated Prior to Admission medications    Medication Sig Start Date End Date Taking? Authorizing Provider   nitrofurantoin, macrocrystal-monohydrate, (MACROBID) 100 MG capsule Take 1 capsule by mouth 2 times daily for 7 days 2/14/20 2/21/20  KENNETH Molina CNP   clopidogrel (PLAVIX) 75 MG tablet Take 75 mg by mouth daily    Historical Provider, MD   memantine (NAMENDA) 5 MG tablet Take 1 tablet by mouth 2 times daily 2/3/20   Alexandrea Sinha MD   apixaban (ELIQUIS) 5 MG TABS tablet Take 1 tablet by mouth 2 times daily 12/19/19   Marcela Patel DO   Nebulizers (COMPRESSOR/NEBULIZER) MISC 1 Units by Does not apply route 4 times daily Use QID 12/18/19   Marcela Patel DO   ipratropium-albuterol (DUONEB) 0.5-2.5 (3) MG/3ML SOLN nebulizer solution Inhale 3 mLs into the lungs every 4 hours as needed for Shortness of Breath 12/18/19   Marcela Patel DO   albuterol (PROVENTIL) (2.5 MG/3ML) 0.083% nebulizer solution Take 3 mLs by nebulization every 4 hours as needed for Wheezing 12/12/19   Marcela Patel DO   albuterol sulfate HFA (PROVENTIL HFA) 108 (90 Base) MCG/ACT inhaler Inhale 2 puffs into the lungs every 6 hours as needed for Wheezing 12/12/19   Marcela Patel DO   carvedilol (COREG) 3.125 MG tablet Take 1 tablet by mouth 2 times daily (with meals) 11/26/19   Cade Best DO   lisinopril (PRINIVIL;ZESTRIL) 5 MG tablet Take 1 tablet by mouth daily 11/26/19   Cade Best DO   nystatin (MYCOSTATIN) 147742 UNIT/GM powder Apply topically 3 times daily Apply topically 3 times a day to area under breasts.     Historical Provider, MD   donepezil (ARICEPT) 10 MG tablet Take 1 tablet by mouth nightly 11/1/19   Marcela Patel DO   hydrALAZINE (APRESOLINE) 10 MG tablet Take 1 tablet by mouth 3 times daily 11/1/19   Marcela Patel DO   atorvastatin (LIPITOR) 40 MG tablet Take 1 tablet by mouth daily 11/1/19   Marcela Patel DO   pantoprazole (PROTONIX) 40 MG tablet Take 1 tablet by mouth daily 11/1/19 to other specified organisms J20.8    Acute cystitis without hematuria N30.00    Pulmonary hypertension, moderate to severe (HCC) I27.20    Dementia without behavioral disturbance (HCC) F03.90    Hypercapnic respiratory failure (HCC) J96.92    Vascular dementia (HCC) F01.50    Dementia (HCC) F03.90    CHF (congestive heart failure) (HCC) I50.9    A-fib (HCC) I48.91    Chronic anticoagulation Z79.01    Supratherapeutic INR R79.1         Acute on chronic  hypoxic respiratory failure better   Chronic home 2 l o2 with poor compliance   mirta , OHS -unsure if patient has positive airway pressure machine at home  Chronic hypercapnea   Chronic respiratory acidosis  With compensation   Moderate MR   Acute on chronic diastolic and valvular  chf improved   Pulmonary hypertensin group 2 , Group 3      Morbid obesity   B/l pleural effusion  Supra therapeutic  inr - nutritional/ secondary to congestive hepatopathy   Paroxysmal A. Fib  PLAN:      Cont diuretics rec  change to po lasix   Cont nocturnal BiPAP   Cont o2 supplementation to keep SATs 88-92 %   Pt is non compliant with CPAP at home   Continue duo neb three times daily on dc  Pt needs continue chf tt , fluid restriction and compliance with cpap and home o2 . Ok to dc from pulmonary standpoint   Follow up In clinic in 4 weeks   Will sign off        D/w rn        I hope this updates you on my evaluation and clinical thinking. Thank you for allowing me to participate in his care.      Sincerely,      Electronically signed by Pj Boles MD on 2/21/2020 at 12:34 PM

## 2020-02-21 NOTE — PROGRESS NOTES
[address]    Progress Note    2/21/2020    5:21 PM    Name:   Mauricio Nunez  MRN:     [id number]     Acct:      [de-identified]   Room:   2029/2029-01  IP Day:  2  Admit Date:  2/19/2020  4:39 PM    PCP:   Evelin Butler DO  Code Status:  Full Code    Subjective:     C/C:   Chief Complaint   Patient presents with    Chest Pain     Interval History Status:   Improved  Doing okay with diet  Breathing better  A. fib appears to be rate controlled  On nasal cannula  O2 sats satisfactory  Diuresing     Data Base Updates:  Cultures remain negative:  Specimen Source: Blood Specimen Description . BLOOD Special Requests RH,5CC Culture NO GROWTH 2 DAYS     Brief History:     As documented in the medical record:  \"Patient presenting with her son and overnight with shortness of breath over the past several days. She reports that she has had a very poor appetite over that time. She had a recent admission as well for acute on chronic diastolic heart failure, along with a acute urinary tract infection. She has chronic diastolic heart failure, moderate MR, and obstructive sleep apnea for which she is noncompliant with CPAP. She has a history of pulmonary hypertension as well. Discussed the case with Dr. Kalyn Armando this morning. Chest x-ray is concerning for pulmonary edema. \"     The patient was admitted  Initial plan has included  Optimize cardio-pulmonary function   Diuresis  Respiratory Therapy and Bronchodilators prn   BiPAP as needed  Oxygen supplementation  Titrate Coumadin to maintain therapeutic INR / was transition to Eliquis? The patient was found to have macrocytosis:  Results for Eliazar Roth (MRN [id number]) as of 2/21/2020 17:36   Ref. Range 10/1/2019 11:13 11/22/2019 04:59 12/18/2019 17:14   TSH Latest Ref Range: 0.30 - 5.00 mIU/L 2.01 0.82 1.40         Medications:      Allergies:  No Known Allergies    Current Meds:   Scheduled Meds:    [START ON 2020] pantoprazole  40 mg Oral QAM AC    furosemide  40 mg Intravenous BID    [Held by provider] apixaban  5 mg Oral BID    atorvastatin  40 mg Oral Daily    carvedilol  3.125 mg Oral BID WC    citalopram  20 mg Oral Daily    clopidogrel  75 mg Oral Daily    donepezil  10 mg Oral Nightly    hydrALAZINE  10 mg Oral TID    lisinopril  5 mg Oral Daily    memantine  5 mg Oral BID    miconazole   Topical BID    sodium chloride flush  10 mL Intravenous 2 times per day    ipratropium-albuterol  1 ampule Inhalation Q4H WA     Continuous Infusions:   PRN Meds: sodium chloride flush, acetaminophen, acetaminophen, polyethylene glycol, promethazine, ondansetron, nicotine, albuterol    Data:     Past Medical History:   has a past medical history of A-fib ([facility] CHF (congestive heart failure) ([facility] [address] Dementia ([facility] [address] [facility] Dementia ([facility] [facility] Essential hypertension, Lymphedema of both lower extremities, Memory loss, Stroke ([facility] [address] and Vascular dementia ([facility] [address] [address] Social History:   reports that she has never smoked. She has never used smokeless tobacco. She reports that she does not drink alcohol or use drugs. Family History:   Family History   Problem Relation Age of Onset   Ozzie Clark Cancer Mother     Cancer Father        Vitals:  /75   Pulse 74   Temp 98 °F (36.7 °C) (Temporal)   Resp 24   Ht 4' 11\" (1.499 m)   Wt 246 lb 6.4 oz (111.8 kg)   SpO2 93%   BMI 49.77 kg/m²   Temp (24hrs), Av.9 °F (36.6 °C), Min:96.5 °F (35.8 °C), Max:98.7 °F (37.1 °C)    No results for input(s): POCGLU in the last 72 hours. I/O (24Hr): Intake/Output Summary (Last 24 hours) at 2020 1721  Last data filed at 2020 1600  Gross per 24 hour   Intake 130 ml   Output 1650 ml   Net -1520 ml         Review of Systems:     Review of Systems   Constitutional: Positive for activity change (Improved) and appetite change (Increased). Negative for chills, diaphoresis and fever.    Respiratory: Positive for shortness of breath (Still some dyspnea on exertion). Negative for cough. Cardiovascular: Positive for leg swelling (Chronic). Negative for chest pain and palpitations. Gastrointestinal: Negative for abdominal pain, nausea and vomiting. Genitourinary: Negative for flank pain and hematuria. Physical Examination:        Physical Exam  Vitals signs reviewed. Constitutional:       General: She is not in acute distress. Appearance: She is not diaphoretic. HENT:      Head: Normocephalic. Nose: Nose normal.   Eyes:      General: No scleral icterus. Conjunctiva/sclera: Conjunctivae normal.   Neck:      Musculoskeletal: Neck supple. Trachea: No tracheal deviation. Cardiovascular:      Rate and Rhythm: Normal rate. Comments: Telemetry reveals atrial fibrillation  Pulmonary:      Effort: Pulmonary effort is normal. No respiratory distress. Breath sounds: Normal breath sounds. No wheezing or rales. Comments: Airflow reduced  No retractions  No accessory muscle use  No cyanosis    Chest:      Chest wall: No tenderness. Abdominal:      General: Bowel sounds are normal. There is no distension. Palpations: Abdomen is soft. Tenderness: There is no abdominal tenderness. Musculoskeletal:         General: No tenderness. Right lower leg: Edema present. Left lower leg: Edema present. Skin:     General: Skin is warm and dry.            Labs:  Hematology:  Recent Labs     02/19/20 1650 02/20/20  0358 02/20/20  0452 02/20/20  1158   WBC 6.3 5.6  --   --    RBC 4.45 3.93*  --   --    HGB 14.0 12.6  --   --    HCT 46.3 40.5  --   --    .0* 103.1*  --   --    MCH 31.5 32.1  --   --    MCHC 30.2 31.1  --   --    RDW 15.1* 15.1*  --   --     206  --   --    MPV 10.9 11.6  --   --    INR 3.9  --  5.0* 4.8*     Chemistry:  Recent Labs     02/19/20 1650 02/19/20  1849 02/20/20  0358     --  142   K 4.0  --  3.8   CL 98  --  98   CO2 35*  --  37*   GLUCOSE 91  --  92   BUN 12

## 2020-02-21 NOTE — PROGRESS NOTES
Transitions of Care Pharmacy Service   Medication Review    The patient's list of current home medications has been reviewed. Her PCP office prescribes in [address] Source(s) of information: pt's son, Epic, Surescripts refill report      Other Notes She appears long overdue for Plavix per pharmacy report, but son confirmed she was taking it at home up until recently when she finally ran out of  pills-- he states the pharmacy is waiting for refill authorization. Her hospital and SNF admissions may be why she has not needed to refill this med until now. All other meds are up to date per refill history. Please feel free to call me with any questions about this encounter. Thank you.     Rubi Stearns, [facility]   Transitions of Care Pharmacy Service  Phone:  [phone]  Fax: [phone]      Electronically signed by Rubi Stearns [facility] on 2/20/2020 at 6:56 PM           Medications Prior to Admission:   nitrofurantoin, macrocrystal-monohydrate, (MACROBID) 100 MG capsule, Take 1 capsule by mouth 2 times daily for 7 days  clopidogrel (PLAVIX) 75 MG tablet, Take 75 mg by mouth daily  memantine (NAMENDA) 5 MG tablet, Take 1 tablet by mouth 2 times daily  apixaban (ELIQUIS) 5 MG TABS tablet, Take 1 tablet by mouth 2 times daily  ipratropium-albuterol (DUONEB) 0.5-2.5 (3) MG/3ML SOLN nebulizer solution, Inhale 3 mLs into the lungs every 4 hours as needed for Shortness of Breath  albuterol (PROVENTIL) (2.5 MG/3ML) 0.083% nebulizer solution, Take 3 mLs by nebulization every 4 hours as needed for Wheezing  albuterol sulfate HFA (PROVENTIL HFA) 108 (90 Base) MCG/ACT inhaler, Inhale 2 puffs into the lungs every 6 hours as needed for Wheezing  carvedilol (COREG) 3.125 MG tablet, Take 1 tablet by mouth 2 times daily (with meals)  lisinopril (PRINIVIL;ZESTRIL) 5 MG tablet, Take 1 tablet by mouth daily  nystatin (MYCOSTATIN) 412007 UNIT/GM powder, Apply topically 3 times daily Apply topically 3 times a day to area under breasts.   donepezil (ARICEPT) 10 MG tablet, Take 1 tablet by mouth nightly  hydrALAZINE (APRESOLINE) 10 MG tablet, Take 1 tablet by mouth 3 times daily  atorvastatin (LIPITOR) 40 MG tablet, Take 1 tablet by mouth daily  pantoprazole (PROTONIX) 40 MG tablet, Take 1 tablet by mouth daily  furosemide (LASIX) 40 MG tablet, Take 1 tablet by mouth 2 times daily  citalopram (CELEXA) 20 MG tablet, Take 1 tablet by mouth daily

## 2020-02-21 NOTE — PROGRESS NOTES
Occupational Therapy   Occupational Therapy Initial Assessment  Date: 2020   Patient Name: Evens Larsen  MRN: [id number]     : 1945    Date of Service: 2020    Discharge Recommendations:  [address] RN reports patient is medically stable for therapy treatment this date. Chart reviewed prior to treatment and patient is agreeable for therapy. All lines intact and patient positioned comfortably at end of treatment. All patient needs addressed prior to ending therapy session. Assessment   Performance deficits / Impairments: Decreased functional mobility ; Decreased ADL status; Decreased strength;Decreased safe awareness;Decreased endurance;Decreased cognition;Decreased posture  Prognosis: Good  Decision Making: High Complexity  OT Education: OT Role;Energy Conservation;Plan of Care;Home Exercise Program;Transfer Training;Family Education;Precautions;Orientation; ADL Adaptive Strategies; Equipment  REQUIRES OT FOLLOW UP: Yes  Activity Tolerance  Activity Tolerance: Patient Tolerated treatment well  Safety Devices  Safety Devices in place: Yes  Type of devices: Call light within reach;Nurse notified;Gait belt;Patient at risk for falls; Left in chair           Patient Diagnosis(es): The encounter diagnosis was Acute respiratory failure with hypercapnia ([address] has a past medical history of A-fib ([address] CHF (congestive heart failure) ([address] Dementia ([address] Dementia ([address] Essential hypertension, Lymphedema of both lower extremities, Memory loss, Stroke ([address] and Vascular dementia ([address] has a past surgical history that includes Knee Arthroplasty; Hysterectomy; and  section.            Restrictions  Restrictions/Precautions  Restrictions/Precautions: General Precautions, Fall Risk, Up as Tolerated  Position Activity Restriction  Other position/activity restrictions:  O2, telemetry    Subjective   General  Patient assessed for rehabilitation services?: Yes  Pain Assessment  Hickman-Massey Pain Rating: No hurt  Vital Signs  Temp: 96.5 °F (35.8 °C)  Temp Source: Temporal  Pulse: 68  Heart Rate Source: Monitor  Resp: 17  BP: 111/74  BP Location: Right upper arm  BP Upper/Lower: Upper  MAP (mmHg): 83  Patient Position: Semi fowlers  Level of Consciousness: Responds to Voice or New Confusion or Agitation  MEWS Score: 2  Oxygen Therapy  SpO2: 93 %  Social/Functional History  Social/Functional History  Lives With: Family(Son and grandson)  Type of Home: House  Home Layout: Two level, Able to Live on Main level with bedroom/bathroom  Home Access: Stairs to enter with rails  Entrance Stairs - Number of Steps: 3 (wide steps that walker will fit on)  Entrance Stairs - Rails: Right  Bathroom Shower/Tub: Walk-in shower, Shower chair without back  Bathroom Toilet: Standard  Bathroom Equipment: Grab bars in shower  Home Equipment: Wheelchair-manual(Rollator)  Receives Help From: Family  ADL Assistance: Needs assistance  Homemaking Assistance: Needs assistance  Ambulation Assistance: Independent(Furniture walks in home, RW outside of home)  Transfer Assistance: Independent  Active : No  Patient's  Info: family drives       Objective   Vision: Impaired  Vision Exceptions: Wears glasses at all times  Hearing: Within functional limits    Orientation  Overall Orientation Status: Impaired  Orientation Level: Disoriented to situation;Oriented to place;Oriented to person;Disoriented to time  Observation/Palpation  Posture: Fair  Observation: Patient with BLE lymphedema, very short legs making sit to stand from bed difficult. Balance  Sitting Balance: Contact guard assistance  Standing Balance: Minimal assistance(X2 with RW )  Functional Mobility  Functional - Mobility Device: Rolling Walker  Assist Level: Minimal assistance(x2 for bed to recliner. Pt needing assist primarily for guiding walker and follow directions. )  Functional Mobility Comments: pt is limited by cognition and fatigue. Agreeable to sitting up in recliner. ADL  Feeding: Setup;Stand by assistance;Minimal assistance  Grooming: Minimal assistance; Moderate assistance  UE Bathing: Moderate assistance;Maximum assistance  LE Bathing: Maximum assistance  UE Dressing: Moderate assistance;Maximum assistance  LE Dressing: Maximum assistance  Toileting: Maximum assistance(TINSLEY )  Tone RUE  RUE Tone: Normotonic  Tone LUE  LUE Tone: Normotonic  Coordination  Movements Are Fluid And Coordinated: Yes     Bed mobility  Supine to Sit: Minimal assistance; Moderate assistance;2 Person assistance  Comment: up to chair  Transfers  Sit to stand: 2 Person assistance;Minimal assistance  Stand to sit: 2 Person assistance;Minimal assistance  Transfer Comments: pt stood from bed and from recliner to assure pt could stand from lower surface     Cognition  Overall Cognitive Status: Exceptions  Arousal/Alertness: Appropriate responses to stimuli  Following Commands:  Follows one step commands consistently  Attention Span: Appears intact  Memory: Decreased recall of biographical Information;Decreased recall of recent events;Decreased short term memory;Decreased long term memory  Safety Judgement: Decreased awareness of need for assistance;Decreased awareness of need for safety  Problem Solving: Assistance required to generate solutions;Assistance required to implement solutions;Assistance required to identify errors made;Assistance required to correct errors made;Decreased awareness of errors  Insights: Decreased awareness of deficits  Initiation: Requires cues for some  Sequencing: Requires cues for some  Perception  Overall Perceptual Status: WFL     Sensation  Overall Sensation Status: WFL        LUE AROM (degrees)  LUE AROM : WFL  RUE AROM (degrees)  RUE AROM : WFL  LUE Strength  Gross LUE Strength: WFL  RUE Strength  Gross RUE Strength: WFL                   Plan   Plan  Times per week: 4-5x/week, 1-2x/day  Current Treatment Recommendations: Strengthening, Functional Mobility Training, Endurance Training, Balance Training, Patient/Caregiver Education & Training, Equipment Evaluation, Education, & procurement, Safety Education & Training, Positioning, Self-Care / ADL, Cognitive/Perceptual Training         Goals  Short term goals  Time Frame for Short term goals: by discharge, pt will  Short term goal 1: demo CGA with ADL transfers with approp AD/DME with good safety  Short term goal 2: demo CGA with functional mob with RW for ADL completion with min cues for good safety  Short term goal 3: demo min A with toileting routine  Short term goal 4: demo min A with UB ADLs and mod A LB AdLs with approp DME and min cues for safety with min cues for initation/sequencing  Short term goal 5: demo SBA with grooming tasks at approp level following set up  Patient Goals   Patient goals : not stated       Therapy Time   Individual Concurrent Group Co-treatment   Time In 1100         Time Out 1134         Minutes 34              Patient would benefit from SNF for continued occupational therapy to increase independence with  ADL of bathing, dressing, toileting and grooming. Writer recommending SNF placement for for activity tolerance and strength which will increase independence with ADL's coordinated with bed mobility and chair transfers. Continued skilled OT services to address decreased safety awareness with ADL and IADL tasks and for education and increased independence with DME and AE for fall prevention and ec/ws techniques prior to d/c home. Co-treatment with PT warranted secondary to decreased safety and independence requiring 2 skilled therapy professionals to address individual discipline's goals.  OT addressing preparation for ADL transfer, sitting balance for increased ADL performance, sitting/activity tolerance, functional reaching, environmental safety/scanning, fall prevention, functional mobility for ADL transfers, ability to sequence and follow directions and functional UE strength.     Indira Foreman, OT

## 2020-02-21 NOTE — PROGRESS NOTES
Physical Therapy  Facility/Department: [facility] CV[facility]  Daily Treatment Note  NAME: Gibson Hughes  :   MRN: [id number]    Date of Service: 2020    Discharge Recommendations:  Subacute/Skilled Nursing Facility, Continue to assess pending progress        Assessment   Body structures, Functions, Activity limitations: Decreased functional mobility ; Decreased ADL status; Decreased strength;Decreased safe awareness;Decreased cognition;Decreased endurance;Decreased balance;Decreased posture  Assessment: Recommend SNF as patient requires assist to amb 5 feet. Patient previously supervision with mobility at home. Prognosis: Good  Decision Making: High Complexity  REQUIRES PT FOLLOW UP: Yes  Activity Tolerance  Activity Tolerance: Patient limited by cognitive status; Patient limited by endurance     Patient Diagnosis(es): The encounter diagnosis was Acute respiratory failure with hypercapnia ([facility] has a past medical history of A-fib ([facility] CHF (congestive heart failure) ([facility] Dementia ([address] Dementia ([address] Essential hypertension, Lymphedema of both lower extremities, Memory loss, Stroke ([facility] and Vascular dementia ([facility] has a past surgical history that includes Knee Arthroplasty; Hysterectomy; and  section. Restrictions  Restrictions/Precautions  Restrictions/Precautions: General Precautions, Fall Risk, Up as Tolerated  Position Activity Restriction  Other position/activity restrictions:  O2, telemetry  Subjective   General  Chart Reviewed: Yes  Additional Pertinent Hx: Dementia, CHF, A-fib, BLE lymphedema, CVA  Response To Previous Treatment: Patient with no complaints from previous session. Family / Caregiver Present: No  Subjective  Subjective: Patient pleasant and agreeable to PT. General Comment  Comments: Amber Mcneil RN reports patient appropriate for PT.           Orientation     Cognition      Objective   Bed mobility  Supine to Sit: Minimal assistance;2 Person assistance  Scooting: Minimal

## 2020-02-22 PROBLEM — E87.6 HYPOKALEMIA: Status: ACTIVE | Noted: 2020-02-22

## 2020-02-22 LAB
-: NORMAL
ANION GAP SERPL CALCULATED.3IONS-SCNC: 7 MMOL/L (ref 9–17)
BUN BLDV-MCNC: 12 MG/DL (ref 8–23)
BUN/CREAT BLD: 19 (ref 9–20)
CALCIUM SERPL-MCNC: 8.5 MG/DL (ref 8.6–10.4)
CHLORIDE BLD-SCNC: 94 MMOL/L (ref 98–107)
CO2: 42 MMOL/L (ref 20–31)
CREAT SERPL-MCNC: 0.62 MG/DL (ref 0.5–0.9)
FOLATE: 7.3 NG/ML
GFR AFRICAN AMERICAN: >60 ML/MIN
GFR NON-AFRICAN AMERICAN: >60 ML/MIN
GFR SERPL CREATININE-BSD FRML MDRD: ABNORMAL ML/MIN/{1.73_M2}
GFR SERPL CREATININE-BSD FRML MDRD: ABNORMAL ML/MIN/{1.73_M2}
GLUCOSE BLD-MCNC: 98 MG/DL (ref 70–99)
INR BLD: 2.4
POTASSIUM SERPL-SCNC: 3.2 MMOL/L (ref 3.7–5.3)
PROTHROMBIN TIME: 24.1 SEC (ref 9.7–11.6)
REASON FOR REJECTION: NORMAL
SODIUM BLD-SCNC: 143 MMOL/L (ref 135–144)
VITAMIN B-12: 439 PG/ML (ref 232–1245)
ZZ NTE CLEAN UP: ORDERED TEST: NORMAL
ZZ NTE WITH NAME CLEAN UP: SPECIMEN SOURCE: NORMAL

## 2020-02-22 PROCEDURE — 85610 PROTHROMBIN TIME: CPT

## 2020-02-22 PROCEDURE — 6370000000 HC RX 637 (ALT 250 FOR IP): Performed by: INTERNAL MEDICINE

## 2020-02-22 PROCEDURE — 2580000003 HC RX 258: Performed by: NURSE PRACTITIONER

## 2020-02-22 PROCEDURE — 94761 N-INVAS EAR/PLS OXIMETRY MLT: CPT

## 2020-02-22 PROCEDURE — 36415 COLL VENOUS BLD VENIPUNCTURE: CPT

## 2020-02-22 PROCEDURE — 6370000000 HC RX 637 (ALT 250 FOR IP): Performed by: NURSE PRACTITIONER

## 2020-02-22 PROCEDURE — 6360000002 HC RX W HCPCS: Performed by: INTERNAL MEDICINE

## 2020-02-22 PROCEDURE — 80048 BASIC METABOLIC PNL TOTAL CA: CPT

## 2020-02-22 PROCEDURE — 99232 SBSQ HOSP IP/OBS MODERATE 35: CPT | Performed by: INTERNAL MEDICINE

## 2020-02-22 PROCEDURE — 2700000000 HC OXYGEN THERAPY PER DAY

## 2020-02-22 PROCEDURE — 82746 ASSAY OF FOLIC ACID SERUM: CPT

## 2020-02-22 PROCEDURE — 2060000000 HC ICU INTERMEDIATE R&B

## 2020-02-22 PROCEDURE — 94660 CPAP INITIATION&MGMT: CPT

## 2020-02-22 PROCEDURE — 94640 AIRWAY INHALATION TREATMENT: CPT

## 2020-02-22 PROCEDURE — 82607 VITAMIN B-12: CPT

## 2020-02-22 RX ORDER — POTASSIUM CHLORIDE 7.45 MG/ML
10 INJECTION INTRAVENOUS PRN
Status: DISCONTINUED | OUTPATIENT
Start: 2020-02-22 | End: 2020-02-23 | Stop reason: HOSPADM

## 2020-02-22 RX ORDER — POTASSIUM CHLORIDE 20 MEQ/1
40 TABLET, EXTENDED RELEASE ORAL PRN
Status: DISCONTINUED | OUTPATIENT
Start: 2020-02-22 | End: 2020-02-23 | Stop reason: HOSPADM

## 2020-02-22 RX ORDER — POTASSIUM CHLORIDE 20 MEQ/1
20 TABLET, EXTENDED RELEASE ORAL DAILY
Qty: 20 TABLET | Refills: 0 | DISCHARGE
Start: 2020-02-22

## 2020-02-22 RX ORDER — FUROSEMIDE 40 MG/1
40 TABLET ORAL 2 TIMES DAILY
Status: DISCONTINUED | OUTPATIENT
Start: 2020-02-22 | End: 2020-02-23 | Stop reason: HOSPADM

## 2020-02-22 RX ADMIN — CITALOPRAM HYDROBROMIDE 20 MG: 20 TABLET ORAL at 09:11

## 2020-02-22 RX ADMIN — CARVEDILOL 3.12 MG: 3.12 TABLET, FILM COATED ORAL at 09:11

## 2020-02-22 RX ADMIN — ANTI-FUNGAL POWDER MICONAZOLE NITRATE TALC FREE: 1.42 POWDER TOPICAL at 09:15

## 2020-02-22 RX ADMIN — SODIUM CHLORIDE, PRESERVATIVE FREE 10 ML: 5 INJECTION INTRAVENOUS at 09:00

## 2020-02-22 RX ADMIN — HYDRALAZINE HYDROCHLORIDE 10 MG: 10 TABLET, FILM COATED ORAL at 14:49

## 2020-02-22 RX ADMIN — DONEPEZIL HYDROCHLORIDE 10 MG: 10 TABLET, FILM COATED ORAL at 20:23

## 2020-02-22 RX ADMIN — LISINOPRIL 5 MG: 5 TABLET ORAL at 09:11

## 2020-02-22 RX ADMIN — HYDRALAZINE HYDROCHLORIDE 10 MG: 10 TABLET, FILM COATED ORAL at 09:11

## 2020-02-22 RX ADMIN — IPRATROPIUM BROMIDE AND ALBUTEROL SULFATE 1 AMPULE: .5; 3 SOLUTION RESPIRATORY (INHALATION) at 10:56

## 2020-02-22 RX ADMIN — PANTOPRAZOLE SODIUM 40 MG: 40 TABLET, DELAYED RELEASE ORAL at 06:38

## 2020-02-22 RX ADMIN — IPRATROPIUM BROMIDE AND ALBUTEROL SULFATE 1 AMPULE: .5; 3 SOLUTION RESPIRATORY (INHALATION) at 07:28

## 2020-02-22 RX ADMIN — CARVEDILOL 3.12 MG: 3.12 TABLET, FILM COATED ORAL at 17:34

## 2020-02-22 RX ADMIN — MEMANTINE HYDROCHLORIDE 5 MG: 5 TABLET, FILM COATED ORAL at 20:23

## 2020-02-22 RX ADMIN — SODIUM CHLORIDE, PRESERVATIVE FREE 10 ML: 5 INJECTION INTRAVENOUS at 20:31

## 2020-02-22 RX ADMIN — IPRATROPIUM BROMIDE AND ALBUTEROL SULFATE 1 AMPULE: .5; 3 SOLUTION RESPIRATORY (INHALATION) at 14:48

## 2020-02-22 RX ADMIN — MEMANTINE HYDROCHLORIDE 5 MG: 5 TABLET, FILM COATED ORAL at 09:11

## 2020-02-22 RX ADMIN — ACETAMINOPHEN 650 MG: 325 TABLET ORAL at 20:24

## 2020-02-22 RX ADMIN — FUROSEMIDE 40 MG: 10 INJECTION, SOLUTION INTRAMUSCULAR; INTRAVENOUS at 10:00

## 2020-02-22 RX ADMIN — FUROSEMIDE 40 MG: 40 TABLET ORAL at 17:34

## 2020-02-22 RX ADMIN — ATORVASTATIN CALCIUM 40 MG: 40 TABLET, FILM COATED ORAL at 09:11

## 2020-02-22 RX ADMIN — IPRATROPIUM BROMIDE AND ALBUTEROL SULFATE 1 AMPULE: .5; 3 SOLUTION RESPIRATORY (INHALATION) at 18:23

## 2020-02-22 RX ADMIN — CLOPIDOGREL BISULFATE 75 MG: 75 TABLET ORAL at 09:11

## 2020-02-22 RX ADMIN — POTASSIUM CHLORIDE 40 MEQ: 20 TABLET, EXTENDED RELEASE ORAL at 14:49

## 2020-02-22 RX ADMIN — ANTI-FUNGAL POWDER MICONAZOLE NITRATE TALC FREE: 1.42 POWDER TOPICAL at 20:31

## 2020-02-22 ASSESSMENT — PAIN SCALES - WONG BAKER

## 2020-02-22 ASSESSMENT — PAIN SCALES - GENERAL
PAINLEVEL_OUTOF10: 4
PAINLEVEL_OUTOF10: 0

## 2020-02-22 ASSESSMENT — ENCOUNTER SYMPTOMS
SHORTNESS OF BREATH: 1
VOMITING: 0
COUGH: 0
NAUSEA: 0
ABDOMINAL PAIN: 0

## 2020-02-22 NOTE — PROGRESS NOTES
patient was instructed to follow up with their PCP, Miguel Briceño DO in one week      Medications: Allergies:  No Known Allergies    Current Meds:   Scheduled Meds:    furosemide  40 mg Oral BID    pantoprazole  40 mg Oral QAM AC    [Held by provider] apixaban  5 mg Oral BID    atorvastatin  40 mg Oral Daily    carvedilol  3.125 mg Oral BID WC    citalopram  20 mg Oral Daily    clopidogrel  75 mg Oral Daily    donepezil  10 mg Oral Nightly    hydrALAZINE  10 mg Oral TID    lisinopril  5 mg Oral Daily    memantine  5 mg Oral BID    miconazole   Topical BID    sodium chloride flush  10 mL Intravenous 2 times per day    ipratropium-albuterol  1 ampule Inhalation Q4H WA     Continuous Infusions:   PRN Meds: potassium chloride **OR** potassium alternative oral replacement **OR** potassium chloride, sodium chloride flush, acetaminophen, acetaminophen, polyethylene glycol, promethazine, ondansetron, nicotine, albuterol    Data:     Past Medical History:   has a past medical history of A-fib ([facility] CHF (congestive heart failure) ([facility] Dementia ([facility] Dementia ([facility] Essential hypertension, Lymphedema of both lower extremities, Memory loss, Stroke ([facility] and Vascular dementia ([facility] Social History:   reports that she has never smoked. She has never used smokeless tobacco. She reports that she does not drink alcohol or use drugs. Family History:   Family History   Problem Relation Age of Onset   Bedelia Fruits Cancer Mother     Cancer Father        Vitals:  BP (!) 152/75   Pulse 85   Temp 98.3 °F (36.8 °C) (Temporal)   Resp 24   Ht 4' 11\" (1.499 m)   Wt 249 lb (112.9 kg)   SpO2 91%   BMI 50.29 kg/m²   Temp (24hrs), Av.2 °F (36.2 °C), Min:96.5 °F (35.8 °C), Max:98.3 °F (36.8 °C)    No results for input(s): POCGLU in the last 72 hours. I/O (24Hr):     Intake/Output Summary (Last 24 hours) at 2020 1058  Last data filed at 2020 0600  Gross per 24 hour   Intake 200 ml   Output 1600 ml   Net

## 2020-02-22 NOTE — DISCHARGE INSTR - COC
Continuity of Care Form    Patient Name: James Mera   :    MRN:  [id number]    Admit date:  2020  Discharge date:  2020    Code Status Order: Full Code   Advance Directives:   [address] Directive Type of Healthcare Directive Copy in [address] Agent's Name Healthcare Agent's Phone Number    [phone]  Other (Comment) -- -- -- -- --          Admitting Physician:  Binh Penaloza DO  PCP: Yi Medrano DO    Discharging Nurse: Nubia Johns 39.5 Unit/Room#:   Discharging Unit Phone Number: [phone]    Emergency Contact:   Extended Emergency Contact Information  Primary Emergency Contact: Edu Johnson  Home Phone: [phone]  Relation: Child   needed? No  Secondary Emergency Contact: Jones Johnson  Home Phone: [phone]  Relation: Child   needed? No    Past Surgical History:  Past Surgical History:   Procedure Laterality Date     SECTION      HYSTERECTOMY      KNEE ARTHROPLASTY         Immunization History: There is no immunization history on file for this patient.     Active Problems:  Patient Active Problem List   Diagnosis Code    Multiple wounds of skin R23.8    Excoriation (skin-picking) disorder F42.4    Acute on chronic diastolic heart failure (HCC) I50.33    Vascular dementia without behavioral disturbance (HCC) F01.50    Essential hypertension I10    Stasis dermatitis of both legs I87.2    Lymphedema of both lower extremities I89.0    Acute bronchitis due to other specified organisms J20.8    Acute cystitis without hematuria N30.00    Pulmonary hypertension, moderate to severe (HCC) I27.20    Dementia without behavioral disturbance (HCC) F03.90    Hypercapnic respiratory failure (HCC) J96.92    Vascular dementia (HCC) F01.50    Dementia (HCC) F03.90    CHF (congestive heart failure) (HCC) I50.9    A-fib no  Last Modified Barium Swallow with Video (Video Swallowing Test): not done    Treatments at the Time of Hospital Discharge:   Respiratory Treatments: proventil q 2 hours PRN wheezes, duoneb every 4 hours WA  Oxygen Therapy:  is on oxygen at 2 L/min per nasal cannula. Ventilator:    - No ventilator support    Rehab Therapies: Physical Therapy and Occupational Therapy  Weight Bearing Status/Restrictions: No weight bearing restirctions  Other Medical Equipment (for information only, NOT a DME order):  walker  Other Treatments: ***    Patient's personal belongings (please select all that are sent with patient):  Glasses, Dentures {DESC; UPPER/LOWER/BOTH:20505}    RN SIGNATURE:  Electronically signed by Henrietta Rivera RN on 2/23/20 at 12:24 PM    CASE MANAGEMENT/SOCIAL WORK SECTION    Inpatient Status Date: ***    Readmission Risk Assessment Score:  Readmission Risk              Risk of Unplanned Readmission:        20           Discharging to Facility/ Agency   · Name: [facility]  · Address: [address] [place] [address], [address] [place] [address]  · Phone: [phone]  · Fax: [phone]    Dialysis Facility (if applicable)   · Name:  · Address:  · Dialysis Schedule:  · Phone:  · Fax:    / signature: Electronically signed by Hari Perez RN on 2/23/20 at 12:27 PM    PHYSICIAN SECTION    Prognosis: Fair    Condition at Discharge: Stable    Rehab Potential (if transferring to Rehab): Fair    Recommended Labs or Other Treatments After Discharge:   Optimize cardio-pulmonary function   Lasix  Coreg  Hydralazine  Lisinopril  Diuresis  Respiratory Therapy and Bronchodilators prn   BiPAP as needed  Oxygen supplementation  Monitor INR,  Coumadin vs Eliquis?   Pulmonary evaluation and f/u Dr Nereida Rodriguez and control     Physician Certification: I certify the above information and transfer of Tania Yu  is necessary for the continuing treatment of the diagnosis listed and that she [facility] for less 30 days. Update Admission H&P:   Principal Problem:    Hypercapnic respiratory failure (HCC)  Active Problems:    Supratherapeutic INR    Acute on chronic diastolic heart failure (HCC)    Vascular dementia without behavioral disturbance (HCC)    Essential hypertension    Stasis dermatitis of both legs    Lymphedema of both lower extremities    Pulmonary hypertension, moderate to severe (HCC)    Dementia without behavioral disturbance (HCC)    Dementia (HCC)    A-fib (HCC)    Chronic anticoagulation    Macrocytosis    Hypokalemia  Resolved Problems:    * No resolved hospital problems.  *       PHYSICIAN SIGNATURE:  Electronically signed by Dileep Harden DO on 2/22/20 at 10:56 AM

## 2020-02-23 VITALS
TEMPERATURE: 98.3 F | HEART RATE: 59 BPM | BODY MASS INDEX: 47.78 KG/M2 | SYSTOLIC BLOOD PRESSURE: 124 MMHG | WEIGHT: 237 LBS | HEIGHT: 59 IN | DIASTOLIC BLOOD PRESSURE: 64 MMHG | RESPIRATION RATE: 18 BRPM | OXYGEN SATURATION: 95 %

## 2020-02-23 LAB
INR BLD: 1.9
PROTHROMBIN TIME: 19.2 SEC (ref 9.7–11.6)

## 2020-02-23 PROCEDURE — 94640 AIRWAY INHALATION TREATMENT: CPT

## 2020-02-23 PROCEDURE — 94660 CPAP INITIATION&MGMT: CPT

## 2020-02-23 PROCEDURE — 6370000000 HC RX 637 (ALT 250 FOR IP): Performed by: NURSE PRACTITIONER

## 2020-02-23 PROCEDURE — 36415 COLL VENOUS BLD VENIPUNCTURE: CPT

## 2020-02-23 PROCEDURE — 99239 HOSP IP/OBS DSCHRG MGMT >30: CPT | Performed by: INTERNAL MEDICINE

## 2020-02-23 PROCEDURE — 6370000000 HC RX 637 (ALT 250 FOR IP): Performed by: INTERNAL MEDICINE

## 2020-02-23 PROCEDURE — 94761 N-INVAS EAR/PLS OXIMETRY MLT: CPT

## 2020-02-23 PROCEDURE — 85610 PROTHROMBIN TIME: CPT

## 2020-02-23 PROCEDURE — 2700000000 HC OXYGEN THERAPY PER DAY

## 2020-02-23 RX ADMIN — MEMANTINE HYDROCHLORIDE 5 MG: 5 TABLET, FILM COATED ORAL at 08:55

## 2020-02-23 RX ADMIN — PANTOPRAZOLE SODIUM 40 MG: 40 TABLET, DELAYED RELEASE ORAL at 08:56

## 2020-02-23 RX ADMIN — LISINOPRIL 5 MG: 5 TABLET ORAL at 08:56

## 2020-02-23 RX ADMIN — CITALOPRAM HYDROBROMIDE 20 MG: 20 TABLET ORAL at 08:56

## 2020-02-23 RX ADMIN — FUROSEMIDE 40 MG: 40 TABLET ORAL at 08:56

## 2020-02-23 RX ADMIN — ANTI-FUNGAL POWDER MICONAZOLE NITRATE TALC FREE: 1.42 POWDER TOPICAL at 08:56

## 2020-02-23 RX ADMIN — HYDRALAZINE HYDROCHLORIDE 10 MG: 10 TABLET, FILM COATED ORAL at 13:45

## 2020-02-23 RX ADMIN — CARVEDILOL 3.12 MG: 3.12 TABLET, FILM COATED ORAL at 08:58

## 2020-02-23 RX ADMIN — CLOPIDOGREL BISULFATE 75 MG: 75 TABLET ORAL at 08:56

## 2020-02-23 RX ADMIN — IPRATROPIUM BROMIDE AND ALBUTEROL SULFATE 1 AMPULE: .5; 3 SOLUTION RESPIRATORY (INHALATION) at 06:10

## 2020-02-23 RX ADMIN — ATORVASTATIN CALCIUM 40 MG: 40 TABLET, FILM COATED ORAL at 08:56

## 2020-02-23 RX ADMIN — HYDRALAZINE HYDROCHLORIDE 10 MG: 10 TABLET, FILM COATED ORAL at 08:56

## 2020-02-23 RX ADMIN — IPRATROPIUM BROMIDE AND ALBUTEROL SULFATE 1 AMPULE: .5; 3 SOLUTION RESPIRATORY (INHALATION) at 10:57

## 2020-02-23 ASSESSMENT — PAIN SCALES - GENERAL
PAINLEVEL_OUTOF10: 0
PAINLEVEL_OUTOF10: 0

## 2020-02-23 ASSESSMENT — ENCOUNTER SYMPTOMS
ABDOMINAL PAIN: 0
SHORTNESS OF BREATH: 1
COUGH: 0
NAUSEA: 0
VOMITING: 0

## 2020-02-23 NOTE — PROGRESS NOTES
[facility]    Progress Note    2/23/2020    1:16 PM    Name:   Harshad Fermin  MRN:     [id number]     Acct:      [de-identified]   Room:   2029/2029-01  IP Day:  4  Admit Date:  2/19/2020  4:39 PM    PCP:   Dez Longoria DO  Code Status:  Full Code    Subjective:     C/C:   Chief Complaint   Patient presents with    Chest Pain     Interval History Status:   Comfortable  Resting quietly  No distress  On BiPAP  O2 sats satisfactory    Data Base Updates:  INR 1.9     Specimen Description . BLOOD Special Requests RAC,2CC Culture NO GROWTH 4 DAYS       Brief History:     As documented in the medical record:  \"Patient presenting with her son and overnight with shortness of breath over the past several days. She reports that she has had a very poor appetite over that time. She had a recent admission as well for acute on chronic diastolic heart failure, along with a acute urinary tract infection. She has chronic diastolic heart failure, moderate MR, and obstructive sleep apnea for which she is noncompliant with CPAP. She has a history of pulmonary hypertension as well. Discussed the case with Dr. Agapito Brunner this morning. Chest x-ray is concerning for pulmonary edema. \"     The patient was admitted  Initial plan has included  Optimize cardio-pulmonary function   Diuresis  Respiratory Therapy and Bronchodilators prn   BiPAP as needed  Oxygen supplementation  Titrate Coumadin to maintain therapeutic INR /  transitioned to Eliquis? The patient was found to have macrocytosis:  Results for Eldred Goodell (MRN [id number]) as of 2/21/2020 17:36   Ref. Range 10/1/2019 11:13 11/22/2019 04:59 12/18/2019 17:14   TSH Latest Ref Range: 0.30 - 5.00 mIU/L 2.01 0.82 1.40     The patient responded well to therapy  DC planning was initiated  The patient was instructed to follow up with their PCP, Dez Longoria DO in one week      Medications:      Allergies:  No Known Allergies    Current Meds:   Scheduled Meds:    furosemide  40 mg Oral BID    pantoprazole  40 mg Oral QAM AC    [Held by provider] apixaban  5 mg Oral BID    atorvastatin  40 mg Oral Daily    carvedilol  3.125 mg Oral BID WC    citalopram  20 mg Oral Daily    clopidogrel  75 mg Oral Daily    donepezil  10 mg Oral Nightly    hydrALAZINE  10 mg Oral TID    lisinopril  5 mg Oral Daily    memantine  5 mg Oral BID    miconazole   Topical BID    sodium chloride flush  10 mL Intravenous 2 times per day    ipratropium-albuterol  1 ampule Inhalation Q4H WA     Continuous Infusions:   PRN Meds: potassium chloride **OR** potassium alternative oral replacement **OR** potassium chloride, sodium chloride flush, acetaminophen, acetaminophen, polyethylene glycol, promethazine, ondansetron, nicotine, albuterol    Data:     Past Medical History:   has a past medical history of A-fib ([facility] CHF (congestive heart failure) ([facility] Dementia ([facility] Dementia ([facility] Essential hypertension, Lymphedema of both lower extremities, Memory loss, Stroke ([facility] and Vascular dementia ([facility] Social History:   reports that she has never smoked. She has never used smokeless tobacco. She reports that she does not drink alcohol or use drugs. Family History:   Family History   Problem Relation Age of Onset   العلي Rule Cancer Mother     Cancer Father        Vitals:  /64   Pulse 59   Temp 98.4 °F (36.9 °C) (Temporal)   Resp 18   Ht 4' 11\" (1.499 m)   Wt 237 lb (107.5 kg)   SpO2 95%   BMI 47.87 kg/m²   Temp (24hrs), Av.4 °F (36.3 °C), Min:96.2 °F (35.7 °C), Max:98.5 °F (36.9 °C)    No results for input(s): POCGLU in the last 72 hours. I/O (24Hr):     Intake/Output Summary (Last 24 hours) at 2020 1316  Last data filed at 2020 0856  Gross per 24 hour   Intake --   Output 1950 ml   Net -1950 ml         Review of Systems:     Review of Systems   Constitutional: Positive for activity change (Improved) and appetite change planning  Will discharge when arrangements complete and ok with other services.   Follow-up with PCP in one week, Paulo Napoles DO  Referral to LONG TERM ACUTE CARE [facility] in place  Med rec done  PATRICK done   Discharge planning 36 minutes plus      IP CONSULT TO FAMILY MEDICINE  IP CONSULT TO PULMONOLOGY    Jefry Jacques DO  2/23/2020  1:16 PM

## 2020-02-23 NOTE — DISCHARGE SUMMARY
MCG/ACT inhaler  Commonly known as:  Proventil HFA  Inhale 2 puffs into the lungs every 6 hours as needed for Wheezing     apixaban 5 MG Tabs tablet  Commonly known as:  ELIQUIS  Take 1 tablet by mouth 2 times daily     atorvastatin 40 MG tablet  Commonly known as:  LIPITOR  Take 1 tablet by mouth daily     carvedilol 3.125 MG tablet  Commonly known as:  COREG  Take 1 tablet by mouth 2 times daily (with meals)     citalopram 20 MG tablet  Commonly known as:  CELEXA  Take 1 tablet by mouth daily     clopidogrel 75 MG tablet  Commonly known as:  PLAVIX     Compressor/Nebulizer Misc  1 Units by Does not apply route 4 times daily Use QID     donepezil 10 MG tablet  Commonly known as:  ARICEPT  Take 1 tablet by mouth nightly     furosemide 40 MG tablet  Commonly known as:  LASIX  Take 1 tablet by mouth 2 times daily     hydrALAZINE 10 MG tablet  Commonly known as:  APRESOLINE  Take 1 tablet by mouth 3 times daily     ipratropium-albuterol 0.5-2.5 (3) MG/3ML Soln nebulizer solution  Commonly known as:  DUONEB  Inhale 3 mLs into the lungs every 4 hours as needed for Shortness of Breath     lisinopril 5 MG tablet  Commonly known as:  PRINIVIL;ZESTRIL  Take 1 tablet by mouth daily     memantine 5 MG tablet  Commonly known as:  NAMENDA  Take 1 tablet by mouth 2 times daily     nystatin 127833 UNIT/GM powder  Commonly known as:  MYCOSTATIN     pantoprazole 40 MG tablet  Commonly known as:  PROTONIX  Take 1 tablet by mouth daily     potassium chloride 20 MEQ extended release tablet  Commonly known as:  KLOR-CON M  Take 1 tablet by mouth daily         * This list has 2 medication(s) that are the same as other medications prescribed for you. Read the directions carefully, and ask your doctor or other care provider to review them with you.             STOP taking these medications    nitrofurantoin (macrocrystal-monohydrate) 100 MG capsule  Commonly known as:  Macrobid           Where to Get Your Medications      Information about where to get these medications is not yet available    Ask your nurse or doctor about these medications  · potassium chloride 20 MEQ extended release tablet         Time Spent on discharge is  36 mins in patient examination, evaluation, counseling, medication reconciliation, discharge plan and follow up. Electronically signed by   Miladis Trevino DO  2/23/2020  3:39 PM      Thank you Dr. Reanna Tavares DO for the opportunity to be involved in this patient's care.

## 2020-02-25 LAB
CULTURE: NORMAL
CULTURE: NORMAL
Lab: NORMAL
Lab: NORMAL
SPECIMEN DESCRIPTION: NORMAL
SPECIMEN DESCRIPTION: NORMAL

## 2020-02-27 ENCOUNTER — TELEPHONE (OUTPATIENT)
Dept: FAMILY MEDICINE CLINIC | Age: 75
End: 2020-02-27

## 2020-02-27 NOTE — TELEPHONE ENCOUNTER
[facility]ien [facility] Transitions Initial Follow Up Call    Outreach made within 2 business days of discharge: No    Patient: Wang Warren Patient : 1945   MRN: [id number]  Reason for Admission: There are no discharge diagnoses documented for the most recent discharge. Discharge Date: 20       Spoke with: left message of apt time and day    Discharge department/facility: [facility] Interactive Patient Contact:  Was patient able to fill all prescriptions: Yes  Was patient instructed to bring all medications to the follow-up visit: Yes  Is patient taking all medications as directed in the discharge summary?  Yes  Does patient understand their discharge instructions: Yes  Does patient have questions or concerns that need addressed prior to 7-14 day follow up office visit: no    Scheduled appointment with PCP within 7-14 days    Follow Up  Future Appointments   Date Time Provider Geovanni Bowden   3/4/2020 11:45 AM DO Juvenal Alston   2020  2:00 PM Maxine Steinberg MD Neuro Spec [facility]       Christa Webb

## 2020-03-27 ENCOUNTER — TELEPHONE (OUTPATIENT)
Dept: FAMILY MEDICINE CLINIC | Age: 75
End: 2020-03-27

## 2020-04-02 ENCOUNTER — OFFICE VISIT (OUTPATIENT)
Dept: FAMILY MEDICINE CLINIC | Age: 75
End: 2020-04-02
Payer: MEDICARE

## 2020-04-02 VITALS
SYSTOLIC BLOOD PRESSURE: 133 MMHG | HEIGHT: 59 IN | BODY MASS INDEX: 49.59 KG/M2 | HEART RATE: 72 BPM | OXYGEN SATURATION: 89 % | WEIGHT: 246 LBS | DIASTOLIC BLOOD PRESSURE: 72 MMHG | TEMPERATURE: 97.6 F

## 2020-04-02 PROCEDURE — G8926 SPIRO NO PERF OR DOC: HCPCS | Performed by: INTERNAL MEDICINE

## 2020-04-02 PROCEDURE — G8417 CALC BMI ABV UP PARAM F/U: HCPCS | Performed by: INTERNAL MEDICINE

## 2020-04-02 PROCEDURE — 3017F COLORECTAL CA SCREEN DOC REV: CPT | Performed by: INTERNAL MEDICINE

## 2020-04-02 PROCEDURE — 4040F PNEUMOC VAC/ADMIN/RCVD: CPT | Performed by: INTERNAL MEDICINE

## 2020-04-02 PROCEDURE — 1090F PRES/ABSN URINE INCON ASSESS: CPT | Performed by: INTERNAL MEDICINE

## 2020-04-02 PROCEDURE — G8427 DOCREV CUR MEDS BY ELIG CLIN: HCPCS | Performed by: INTERNAL MEDICINE

## 2020-04-02 PROCEDURE — G8400 PT W/DXA NO RESULTS DOC: HCPCS | Performed by: INTERNAL MEDICINE

## 2020-04-02 PROCEDURE — 3023F SPIROM DOC REV: CPT | Performed by: INTERNAL MEDICINE

## 2020-04-02 PROCEDURE — 1111F DSCHRG MED/CURRENT MED MERGE: CPT | Performed by: INTERNAL MEDICINE

## 2020-04-02 PROCEDURE — 1036F TOBACCO NON-USER: CPT | Performed by: INTERNAL MEDICINE

## 2020-04-02 PROCEDURE — 1123F ACP DISCUSS/DSCN MKR DOCD: CPT | Performed by: INTERNAL MEDICINE

## 2020-04-02 PROCEDURE — 99214 OFFICE O/P EST MOD 30 MIN: CPT | Performed by: INTERNAL MEDICINE

## 2020-04-02 RX ORDER — WARFARIN SODIUM 5 MG/1
TABLET ORAL
Qty: 90 TABLET | Refills: 5 | Status: SHIPPED | OUTPATIENT
Start: 2020-04-02

## 2020-04-02 RX ORDER — CITALOPRAM 20 MG/1
20 TABLET ORAL DAILY
Qty: 90 TABLET | Refills: 5 | Status: SHIPPED | OUTPATIENT
Start: 2020-04-02

## 2020-04-02 ASSESSMENT — PATIENT HEALTH QUESTIONNAIRE - PHQ9
SUM OF ALL RESPONSES TO PHQ9 QUESTIONS 1 & 2: 0
SUM OF ALL RESPONSES TO PHQ QUESTIONS 1-9: 0
2. FEELING DOWN, DEPRESSED OR HOPELESS: 0
SUM OF ALL RESPONSES TO PHQ QUESTIONS 1-9: 0
1. LITTLE INTEREST OR PLEASURE IN DOING THINGS: 0

## 2020-04-02 NOTE — PROGRESS NOTES
with worsening SOB for an ongoing time . She was recently admitted to [facility] and released for treatment of acute hypercapnic respiratory failure. CBC and BMP WNL, , Troponin negative; ABG showed a pH of 7.35 CO2 of 83.7. Patient was placed on BiPAP  UA was positive for nitrites and small leukocyte esterase consistent with UTI and she was started on IV Rocephin. Chest x-ray was unremarkable for mild cardiomegaly and congestion and she was treated with IV Lasix . She was admitted inpatient with consults placed to infectious disease for thigh wound, and pulmonary for acute respiratory failure with hypoxemia. Inpatient treatment plan below with overall clinical response and transfer to [facility]. 1. Acute respiratory failure oxygen placed on BIPAP and weaned off, she is oxygen dependent 2 L via n/c, pulmonary consulted treated with lasix and nebulizer treatment and overall she improved with IV lasix and supportive medications and stable. F/U Pulmonary Dr. Kush Baldwin in 2 weeks. 2. UTI with E. Coli ID KUB negative. Per Dr. Yehuda Toussaint until 3/8/20 and [facility] and negative to date. 3. Right thigh wound + for MRSA  Mupirocin ointment to the anterior nares b.i.d. for 3 days. Hibiclens wipes/daily baths for 10 days. Ct Abdomen And Pelvis Without Contrast  Result Date: 3/3/2020  History:Hydronephrosis. Exam: CT abdomen and pelvis without contrast. All CT scans at this facility use dose modulation, iterative reconstruction, and/or weight based dosing when appropriate to reduce radiation dose to as low as reasonably achievable. Comparison:None Findings: CT abdomen:Small pleural effusions, basilar atelectasis. Cardiomegaly. Moderate coronary artery calcifications. Nonobstructing calcifications in the calyces, ureters or bladder. Nonobstructing 2 mm upper pole right renal stone, 2 mm lower pole left renal stone. No free intraperitoneal air or fluid. No enlarged abdominal lymph nodes.  The gallbladder is not dilated. Minimal peripancreatic fat stranding appears chronic. No abdominal aortic aneurysm. CT pelvis: Focal hernia in the left anterior mid abdominal wall with 3 cm orifice containing loops of transverse colon, but without obstruction, extending just below the skin line. No dilated bowel loops. Acosta balloon catheter decompresses the bladder. No enlarged pelvic or inguinal lymph nodes. No osseous lesions. No acute fractures. Impression: No urinary tract obstruction. Bilateral nephrolithiasis. Ventral hernia left of midline containing loops of colon without obstruction. Pleural effusions, basilar atelectasis, cardiomegaly.  Workstation          Labs:   Results from last 7 days   Lab Units 03/04/20  0450 03/03/20  0441 03/02/20  0450 03/01/20  0348 02/29/20  0406   WBC X10E9/L 9.9 7.1 6.7 6.0 6.0   HEMOGLOBIN g/dL 13.8 13.6 13.8 13.4 13.3   HEMATOCRIT % 41.1 40.9 41.7 40.2 40.3   PLATELETS N89B8/X 955* 146* 172 174 188     Results from last 3 days   CREATININE mg/dL 0.66 0.62 0.68   POTASSIUM mmol/L 3.1* 3.5 4.3   CO2 mmol/L 41* 38* 38*   CHLORIDE mmol/L 91* 92* 90*   GLUCOSE mg/dL 94 126* 169*   MAGNESIUM mg/dL 2.0 2.2 2.1   ALBUMIN g/dL 3.2 3.1* 3.4   AST U/L 9 9 11   ALT U/L 9 10 11   TOTAL PROTEIN g/dL 5.5* 5.6* 5.9*         Results from last 7 days   SPECIFIC GRAVITY PAM 1.025   LEUKOCYTE ESTERASE PAM Small*   UROBILINOGEN PAM eu/dL 2.0*     Medications:  albuterol 2.5 mg /3 mL (0.083 %) nebulizer solution  Commonly known as: PROVENTIL,VENTOLIN      apixaban 5 mg tablet  Commonly known as: ELIQUIS      atorvastatin 40 mg tablet  Commonly known as: LIPITOR      carbamide peroxide 6.5 % otic solution  Commonly known as: DEBROX      carvediloL 3.125 mg tablet  Commonly known as: COREG      citalopram 20 mg tablet  Commonly known as: CeleXA      clopidogreL 75 mg tablet  Commonly known as: PLAVIX      donepeziL 10 mg tablet  Commonly known as: ARICEPT      furosemide 40 mg tablet  Commonly known as: (MYCOSTATIN) 356670 UNIT/GM powder Apply topically 3 times daily Apply topically 3 times a day to area under breasts.  donepezil (ARICEPT) 10 MG tablet Take 1 tablet by mouth nightly 30 tablet 5    hydrALAZINE (APRESOLINE) 10 MG tablet Take 1 tablet by mouth 3 times daily 90 tablet 5    atorvastatin (LIPITOR) 40 MG tablet Take 1 tablet by mouth daily 30 tablet 5    pantoprazole (PROTONIX) 40 MG tablet Take 1 tablet by mouth daily 30 tablet 5    furosemide (LASIX) 40 MG tablet Take 1 tablet by mouth 2 times daily 180 tablet 5        Medications patient taking as of now reconciled against medications ordered at time of hospital discharge: Yes    Chief Complaint   Patient presents with    Follow-Up from Hospital     initially at [facility] for sob,then [facility] for CHF 02/27-out of [place] on Monday    Congestive Heart Failure     feeling better,would like to go back to coumadin,eliquis too expensive. using [facility]       HPI    Inpatient course: Discharge summary reviewed- see chart. Interval history/Current status: After Vilma went back to [place] for one month   Did well there   Has been home with son since Monday   Doing okay per son   [facility] coming in   Nurse, PT and home health aid BID  They will be able to do finger stick for the coumadin   Son wants coumadin back as the eliquis is way too expensive   Does use 02 at home but they did not bring today   He feels her breathing states is very stable right now so does not want to go see Dr Tommy Nazario, pulmonary   No falls since home  Does pick a lot sounds hands wounds  Right UE thigh is chronic , + MRSA treated in [place] and hospital   No changes to medications done in the nursing home or at [place] [facility] .        Vitals:    04/02/20 1103   BP: 133/72   Site: Left Upper Arm   Position: Sitting   Cuff Size: Medium Adult   Pulse: 72   Temp: 97.6 °F (36.4 °C)   TempSrc: Tympanic   SpO2: (!) 89%   Weight: 246 lb (111.6 kg)   Height: 4' 11\" (1.499 m)

## 2020-04-06 ENCOUNTER — TELEPHONE (OUTPATIENT)
Dept: FAMILY MEDICINE CLINIC | Age: 75
End: 2020-04-06

## 2020-04-17 ENCOUNTER — TELEPHONE (OUTPATIENT)
Dept: FAMILY MEDICINE CLINIC | Age: 75
End: 2020-04-17

## 2020-04-20 LAB
INR BLD: 3.4
PROTIME: 41.1 SECONDS

## 2020-04-21 LAB
BILIRUBIN, URINE: NEGATIVE
BLOOD, URINE: NEGATIVE
CLARITY: CLEAR
COLOR: YELLOW
GLUCOSE URINE: NEGATIVE
KETONES, URINE: NEGATIVE
LEUKOCYTE ESTERASE, URINE: NEGATIVE
NITRITE, URINE: NEGATIVE
PH UA: 5.5 (ref 4.5–8)
PROTEIN UA: NEGATIVE
SPECIFIC GRAVITY, URINE: 1.01
UROBILINOGEN, URINE: NORMAL

## 2020-04-23 LAB
INR BLD: 4.3
PROTIME: 51.4 SECONDS

## 2020-04-27 ENCOUNTER — TELEPHONE (OUTPATIENT)
Dept: FAMILY MEDICINE CLINIC | Age: 75
End: 2020-04-27

## 2020-04-28 ENCOUNTER — TELEPHONE (OUTPATIENT)
Dept: FAMILY MEDICINE CLINIC | Age: 75
End: 2020-04-28

## 2020-04-29 ENCOUNTER — TELEPHONE (OUTPATIENT)
Dept: FAMILY MEDICINE CLINIC | Age: 75
End: 2020-04-29

## 2020-04-30 ENCOUNTER — TELEPHONE (OUTPATIENT)
Dept: FAMILY MEDICINE CLINIC | Age: 75
End: 2020-04-30

## 2020-04-30 NOTE — TELEPHONE ENCOUNTER
I called Dr. Alpa Escalante and advised her how pt is doing and she stated to call Physical therapy back and have er call ems and have pt go to er. I called Emmy back with [facility] and she stated that pt health has declined in the past week and the pt is not doing well. Lakshmi Fabienne also stated that she informed pt and pt son how important it is for pt to wear her oxygen. I informed emmy to let the pt know she needs to go to the ER. Lakshmi Loving was going to try to call and see if she could let the pt son know but if she was unable to get a hold of him then she will let the aid know. I also spoke with dr Alpa Escalante and to see if a welfare check was ok and she agreed.

## 2020-04-30 NOTE — TELEPHONE ENCOUNTER
Home PT calling to let you know when she showed up to her house she was alone and without oxygen.  Her level was at 76% she got there once she put it back on her level went up to 93%    Last week patient was able to walk without her oxygen going down    And today she was unable to